# Patient Record
Sex: MALE | Race: ASIAN | NOT HISPANIC OR LATINO | Employment: FULL TIME | URBAN - METROPOLITAN AREA
[De-identification: names, ages, dates, MRNs, and addresses within clinical notes are randomized per-mention and may not be internally consistent; named-entity substitution may affect disease eponyms.]

---

## 2021-01-11 ENCOUNTER — COMMUNICATION - HEALTHEAST (OUTPATIENT)
Dept: NURSING | Facility: CLINIC | Age: 36
End: 2021-01-11

## 2021-01-12 ENCOUNTER — OFFICE VISIT - HEALTHEAST (OUTPATIENT)
Dept: CARDIOLOGY | Facility: CLINIC | Age: 36
End: 2021-01-12

## 2021-01-12 DIAGNOSIS — G47.33 OSA (OBSTRUCTIVE SLEEP APNEA): ICD-10-CM

## 2021-01-12 DIAGNOSIS — E11.9 DIABETES MELLITUS, TYPE 2 (H): ICD-10-CM

## 2021-01-12 DIAGNOSIS — R00.2 PALPITATIONS: ICD-10-CM

## 2021-01-12 DIAGNOSIS — I10 BENIGN ESSENTIAL HYPERTENSION: ICD-10-CM

## 2021-01-12 DIAGNOSIS — E66.01 MORBID OBESITY (H): ICD-10-CM

## 2021-01-12 DIAGNOSIS — I50.32 CHRONIC DIASTOLIC HEART FAILURE (H): ICD-10-CM

## 2021-01-12 LAB
BNP SERPL-MCNC: <10 PG/ML (ref 0–35)
HBA1C MFR BLD: 6.9 %
MAGNESIUM SERPL-MCNC: 2 MG/DL (ref 1.8–2.6)

## 2021-01-12 ASSESSMENT — MIFFLIN-ST. JEOR: SCORE: 2898.09

## 2021-01-13 ENCOUNTER — HOSPITAL ENCOUNTER (OUTPATIENT)
Dept: CARDIOLOGY | Facility: CLINIC | Age: 36
Discharge: HOME | End: 2021-01-13
Attending: INTERNAL MEDICINE

## 2021-01-22 ENCOUNTER — OFFICE VISIT - HEALTHEAST (OUTPATIENT)
Dept: INTERNAL MEDICINE | Facility: CLINIC | Age: 36
End: 2021-01-22

## 2021-01-22 DIAGNOSIS — I44.1 MOBITZ TYPE 1 SECOND DEGREE ATRIOVENTRICULAR BLOCK: ICD-10-CM

## 2021-01-22 DIAGNOSIS — E66.01 MORBID OBESITY (H): ICD-10-CM

## 2021-01-22 DIAGNOSIS — E11.9 TYPE 2 DIABETES MELLITUS WITHOUT COMPLICATION, WITHOUT LONG-TERM CURRENT USE OF INSULIN (H): ICD-10-CM

## 2021-01-22 DIAGNOSIS — Z23 NEED FOR IMMUNIZATION AGAINST INFLUENZA: ICD-10-CM

## 2021-01-22 DIAGNOSIS — I10 BENIGN ESSENTIAL HYPERTENSION: ICD-10-CM

## 2021-01-22 DIAGNOSIS — R00.2 PALPITATIONS: ICD-10-CM

## 2021-01-22 DIAGNOSIS — Z23 NEED FOR TDAP VACCINATION: ICD-10-CM

## 2021-01-22 DIAGNOSIS — G47.33 OSA (OBSTRUCTIVE SLEEP APNEA): ICD-10-CM

## 2021-01-22 LAB
ALBUMIN SERPL-MCNC: 4.5 G/DL (ref 3.5–5)
ALP SERPL-CCNC: 65 U/L (ref 45–120)
ALT SERPL W P-5'-P-CCNC: 131 U/L (ref 0–45)
ANION GAP SERPL CALCULATED.3IONS-SCNC: 11 MMOL/L (ref 5–18)
AST SERPL W P-5'-P-CCNC: 83 U/L (ref 0–40)
BILIRUB SERPL-MCNC: 0.8 MG/DL (ref 0–1)
BUN SERPL-MCNC: 11 MG/DL (ref 8–22)
CALCIUM SERPL-MCNC: 9.5 MG/DL (ref 8.5–10.5)
CHLORIDE BLD-SCNC: 106 MMOL/L (ref 98–107)
CHOLEST SERPL-MCNC: 172 MG/DL
CO2 SERPL-SCNC: 25 MMOL/L (ref 22–31)
CREAT SERPL-MCNC: 0.94 MG/DL (ref 0.7–1.3)
FASTING STATUS PATIENT QL REPORTED: YES
GFR SERPL CREATININE-BSD FRML MDRD: >60 ML/MIN/1.73M2
GLUCOSE BLD-MCNC: 105 MG/DL (ref 70–125)
HDLC SERPL-MCNC: 39 MG/DL
LDLC SERPL CALC-MCNC: 118 MG/DL
POTASSIUM BLD-SCNC: 4.2 MMOL/L (ref 3.5–5)
PROT SERPL-MCNC: 8 G/DL (ref 6–8)
SODIUM SERPL-SCNC: 142 MMOL/L (ref 136–145)
TRIGL SERPL-MCNC: 77 MG/DL

## 2021-01-22 RX ORDER — LISINOPRIL 20 MG/1
20 TABLET ORAL DAILY
Qty: 90 TABLET | Refills: 3 | Status: SHIPPED | OUTPATIENT
Start: 2021-01-22 | End: 2022-01-27

## 2021-01-22 ASSESSMENT — MIFFLIN-ST. JEOR: SCORE: 2800.23

## 2021-01-25 ENCOUNTER — COMMUNICATION - HEALTHEAST (OUTPATIENT)
Dept: INTERNAL MEDICINE | Facility: CLINIC | Age: 36
End: 2021-01-25

## 2021-01-28 ENCOUNTER — RECORDS - HEALTHEAST (OUTPATIENT)
Dept: ADMINISTRATIVE | Facility: OTHER | Age: 36
End: 2021-01-28

## 2021-01-28 LAB — RETINOPATHY: NEGATIVE

## 2021-02-02 ENCOUNTER — RECORDS - HEALTHEAST (OUTPATIENT)
Dept: HEALTH INFORMATION MANAGEMENT | Facility: CLINIC | Age: 36
End: 2021-02-02

## 2021-02-03 ENCOUNTER — RECORDS - HEALTHEAST (OUTPATIENT)
Dept: ADMINISTRATIVE | Facility: OTHER | Age: 36
End: 2021-02-03

## 2021-02-03 ENCOUNTER — HOSPITAL ENCOUNTER (OUTPATIENT)
Dept: CARDIOLOGY | Facility: CLINIC | Age: 36
Discharge: HOME | End: 2021-02-03
Attending: INTERNAL MEDICINE

## 2021-02-03 LAB
AORTIC ROOT: 2.5 CM
AORTIC VALVE MEAN VELOCITY: 101 CM/S
ASCENDING AORTA: 3.4 CM
AV DIMENSIONLESS INDEX VTI: 0.7
AV MEAN GRADIENT: 5 MMHG
AV PEAK GRADIENT: 9.5 MMHG
AV VALVE AREA: 3.6 CM2
AV VELOCITY RATIO: 0.7
BSA FOR ECHO PROCEDURE: 3.01 M2
CV BLOOD PRESSURE: ABNORMAL MMHG
CV ECHO HEIGHT: 68.8 IN
CV ECHO WEIGHT: 412 LBS
DOP CALC AO PEAK VEL: 154 CM/S
DOP CALC AO VTI: 26.1 CM
DOP CALC LVOT AREA: 4.91 CM2
DOP CALC LVOT DIAMETER: 2.5 CM
DOP CALC LVOT PEAK VEL: 102 CM/S
DOP CALC LVOT STROKE VOLUME: 93.2 CM3
DOP CALCLVOT PEAK VEL VTI: 19 CM
EJECTION FRACTION: 67 % (ref 55–75)
FRACTIONAL SHORTENING: 30 % (ref 28–44)
INTERVENTRICULAR SEPTUM IN END DIASTOLE: 1.5 CM (ref 0.6–1)
IVS/PW RATIO: 1.1
LA AREA 1: 17.1 CM2
LA AREA 2: 22.2 CM2
LEFT ATRIUM AREA: 17.1 CM2
LEFT ATRIUM LENGTH: 5.8 CM
LEFT ATRIUM SIZE: 3.8 CM
LEFT ATRIUM VOLUME INDEX: 18.5 ML/M2
LEFT ATRIUM VOLUME: 55.6 ML
LEFT VENTRICLE CARDIAC INDEX: 2.4 L/MIN/M2
LEFT VENTRICLE CARDIAC OUTPUT: 7.2 L/MIN
LEFT VENTRICLE DIASTOLIC VOLUME INDEX: 59.8 CM3/M2 (ref 34–74)
LEFT VENTRICLE DIASTOLIC VOLUME: 180 CM3 (ref 62–150)
LEFT VENTRICLE HEART RATE: 77 BPM
LEFT VENTRICLE MASS INDEX: 101.9 G/M2
LEFT VENTRICLE SYSTOLIC VOLUME INDEX: 19.9 CM3/M2 (ref 11–31)
LEFT VENTRICLE SYSTOLIC VOLUME: 60 CM3 (ref 21–61)
LEFT VENTRICULAR INTERNAL DIMENSION IN DIASTOLE: 5 CM (ref 4.2–5.8)
LEFT VENTRICULAR INTERNAL DIMENSION IN SYSTOLE: 3.5 CM (ref 2.5–4)
LEFT VENTRICULAR MASS: 306.8 G
LEFT VENTRICULAR OUTFLOW TRACT MEAN GRADIENT: 2 MMHG
LEFT VENTRICULAR OUTFLOW TRACT MEAN VELOCITY: 58.3 CM/S
LEFT VENTRICULAR OUTFLOW TRACT PEAK GRADIENT: 4 MMHG
LEFT VENTRICULAR POSTERIOR WALL IN END DIASTOLE: 1.4 CM (ref 0.6–1)
LV STROKE VOLUME INDEX: 31 ML/M2
MITRAL VALVE E/A RATIO: 1.3
MV AVERAGE E/E' RATIO: 7.9 CM/S
MV DECELERATION TIME: 232 MS
MV E'TISSUE VEL-LAT: 14.1 CM/S
MV E'TISSUE VEL-MED: 8.68 CM/S
MV LATERAL E/E' RATIO: 6.4
MV MEDIAL E/E' RATIO: 10.4
MV PEAK A VELOCITY: 71.6 CM/S
MV PEAK E VELOCITY: 90.3 CM/S
NUC REST DIASTOLIC VOLUME INDEX: 6592 LBS
NUC REST SYSTOLIC VOLUME INDEX: 68.75 IN
TRICUSPID VALVE ANULAR PLANE SYSTOLIC EXCURSION: 2.9 CM

## 2021-02-03 ASSESSMENT — MIFFLIN-ST. JEOR: SCORE: 2800.23

## 2021-02-04 ENCOUNTER — AMBULATORY - HEALTHEAST (OUTPATIENT)
Dept: CARDIOLOGY | Facility: CLINIC | Age: 36
End: 2021-02-04

## 2021-02-09 ENCOUNTER — OFFICE VISIT - HEALTHEAST (OUTPATIENT)
Dept: CARDIOLOGY | Facility: CLINIC | Age: 36
End: 2021-02-09

## 2021-02-09 DIAGNOSIS — I10 ESSENTIAL HYPERTENSION: ICD-10-CM

## 2021-02-09 ASSESSMENT — MIFFLIN-ST. JEOR: SCORE: 2777.55

## 2021-02-11 ENCOUNTER — OFFICE VISIT - HEALTHEAST (OUTPATIENT)
Dept: EDUCATION SERVICES | Facility: CLINIC | Age: 36
End: 2021-02-11

## 2021-02-11 DIAGNOSIS — E11.9 TYPE 2 DIABETES MELLITUS WITHOUT COMPLICATION, WITHOUT LONG-TERM CURRENT USE OF INSULIN (H): ICD-10-CM

## 2021-02-23 ENCOUNTER — OFFICE VISIT - HEALTHEAST (OUTPATIENT)
Dept: INTERNAL MEDICINE | Facility: CLINIC | Age: 36
End: 2021-02-23

## 2021-02-23 DIAGNOSIS — I10 BENIGN ESSENTIAL HYPERTENSION: ICD-10-CM

## 2021-02-23 DIAGNOSIS — I44.1 MOBITZ TYPE 1 SECOND DEGREE ATRIOVENTRICULAR BLOCK: ICD-10-CM

## 2021-02-23 DIAGNOSIS — E66.01 MORBID OBESITY (H): ICD-10-CM

## 2021-02-23 DIAGNOSIS — G47.33 OSA (OBSTRUCTIVE SLEEP APNEA): ICD-10-CM

## 2021-02-23 DIAGNOSIS — R79.89 ABNORMAL LFTS: ICD-10-CM

## 2021-02-23 DIAGNOSIS — E11.9 TYPE 2 DIABETES MELLITUS WITHOUT COMPLICATION, WITHOUT LONG-TERM CURRENT USE OF INSULIN (H): ICD-10-CM

## 2021-03-03 ENCOUNTER — RECORDS - HEALTHEAST (OUTPATIENT)
Dept: ADMINISTRATIVE | Facility: OTHER | Age: 36
End: 2021-03-03

## 2021-03-08 ENCOUNTER — COMMUNICATION - HEALTHEAST (OUTPATIENT)
Dept: INTERNAL MEDICINE | Facility: CLINIC | Age: 36
End: 2021-03-08

## 2021-03-11 ENCOUNTER — OFFICE VISIT - HEALTHEAST (OUTPATIENT)
Dept: EDUCATION SERVICES | Facility: CLINIC | Age: 36
End: 2021-03-11

## 2021-03-11 DIAGNOSIS — E11.9 TYPE 2 DIABETES MELLITUS WITHOUT COMPLICATION, WITHOUT LONG-TERM CURRENT USE OF INSULIN (H): ICD-10-CM

## 2021-03-30 ENCOUNTER — RECORDS - HEALTHEAST (OUTPATIENT)
Dept: ADMINISTRATIVE | Facility: OTHER | Age: 36
End: 2021-03-30

## 2021-04-14 ENCOUNTER — AMBULATORY - HEALTHEAST (OUTPATIENT)
Dept: NURSING | Facility: CLINIC | Age: 36
End: 2021-04-14

## 2021-04-15 ENCOUNTER — COMMUNICATION - HEALTHEAST (OUTPATIENT)
Dept: EDUCATION SERVICES | Facility: CLINIC | Age: 36
End: 2021-04-15

## 2021-05-05 ENCOUNTER — AMBULATORY - HEALTHEAST (OUTPATIENT)
Dept: NURSING | Facility: CLINIC | Age: 36
End: 2021-05-05

## 2021-06-05 VITALS
RESPIRATION RATE: 14 BRPM | SYSTOLIC BLOOD PRESSURE: 132 MMHG | DIASTOLIC BLOOD PRESSURE: 88 MMHG | WEIGHT: 315 LBS | HEIGHT: 69 IN | BODY MASS INDEX: 46.65 KG/M2 | HEART RATE: 64 BPM

## 2021-06-05 VITALS
HEART RATE: 64 BPM | SYSTOLIC BLOOD PRESSURE: 160 MMHG | BODY MASS INDEX: 46.65 KG/M2 | DIASTOLIC BLOOD PRESSURE: 70 MMHG | HEIGHT: 69 IN | RESPIRATION RATE: 16 BRPM | WEIGHT: 315 LBS

## 2021-06-05 VITALS
OXYGEN SATURATION: 97 % | DIASTOLIC BLOOD PRESSURE: 92 MMHG | BODY MASS INDEX: 46.65 KG/M2 | HEIGHT: 69 IN | SYSTOLIC BLOOD PRESSURE: 156 MMHG | HEART RATE: 76 BPM | TEMPERATURE: 98.5 F | WEIGHT: 315 LBS

## 2021-06-05 VITALS — BODY MASS INDEX: 46.65 KG/M2 | HEIGHT: 69 IN | WEIGHT: 315 LBS

## 2021-06-05 VITALS
HEART RATE: 84 BPM | TEMPERATURE: 98.9 F | SYSTOLIC BLOOD PRESSURE: 146 MMHG | OXYGEN SATURATION: 96 % | BODY MASS INDEX: 59.16 KG/M2 | WEIGHT: 315 LBS | DIASTOLIC BLOOD PRESSURE: 90 MMHG

## 2021-06-14 NOTE — PATIENT INSTRUCTIONS - HE
New patient visit to internal medicine, establishing primary care for this 35-year-old , who has a new diagnosis of type 2 diabetes, and is now working with cardiology after having been referred from the emergency department on January 8 after presenting with Palpitations and tachycardia, noted to be hypertensive.  Issues are as follows: Type 2 diabetes in the context of severe obesity with body mass index of 61, no signs of endorgan damage, A1c 6.9 measured January 12, 2021, will hold off on medication for now and pursue aggressive lifestyle improvement, and I am referring him to Diabetes Education.  Morbid obesity with body mass index of 61.29, at just about his all-time peak weight of 412 pounds, poor dietary habits, needs aggressive lifestyle management, diabetes education program will help with that.  Palpitations, event monitor study January 21, 2021 showing 3 occasions of Mobitz type I second-degree AV block, and also sinus arrhythmia with bradycardia down to 25 bpm, in the context of likely severe obstructive sleep apnea.  Obstructive sleep apnea, likely severe, he will be having sleep clinic consultation February 3, 2021, and almost surely will get started on CPAP.  Hypertension in the context of obesity, type 2 diabetes, and obstructive sleep apnea, I am going to add lisinopril 20 mg a day on top of his carvedilol.  Lower extremity edema, related to obesity, has normal kidney function markers, will check his liver chemistry tests when I see him back in about a month for blood pressure recheck.  Needs to get COVID-19 vaccine even though he is a young 35 years old, because of comorbidities.    Referral sent today for diabetes education program, also ophthalmology for diabetic eye exam.  Prescription transmitted for lisinopril 20 mg daily.  Vaccines for tetanus and influenza.  See back in 1 month.    He is fasting this morning, so we will check a comprehensive metabolic panel so that we can see his  liver chemistries, and also lipid profile.      Going issue by issue:    Type 2 diabetes in the context of severe obesity with body mass index of 61, no signs of endorgan damage, new diagnosis A1c 6.9 measured January 12, 2021, will hold off on medication for now and pursue aggressive lifestyle improvement, and I am referring him to Diabetes Education.  He does not have any symptoms of neuropathy, at this point there does not appear to be any endorgan damage.  His A1c is less than 7.0.  I think we could hold off on medication now and see how his glycemic control response to lifestyle improvement.  He is already made some significant changes, cutting out rice consumption completely as of about 1 week ago, also cut out sugared pop.  I will prescribe for him lancets, glucose test strips, and a glucometer, so we can start checking his blood sugars twice a day.  He will get additional instructions from the diabetes educator.  I agree with his cutting way back on the carbohydrate intake, but I would caution him not to increase dietary fats or protein.  Instead, I want him to eat plenty of vegetables, which are filling but would not bring as many calories.  Solids are great with light dressing.  Start each meal with a glass of water.  I asked him to check blood sugars first thing in the morning before breakfast, and then collect a reading later in the day, varying the time of day so that we can get some idea of what his diurnal pattern looks like.  I asked him to record his blood sugar readings in a notebook.  Ophthalmology consultation requested.    Morbid obesity with body mass index of 61.29, at just about his all-time peak weight of 412 pounds, poor dietary habits, needs aggressive lifestyle management, diabetes education program will help with that.  I would like to see him implement a 1500-calorie a day diet.  He will get more details through diabetes education.  He told me that he consumes 0 alcohol.  I reminded him  to please completely cut out sweets, Skip dessert, eat slower, control portion size.  Eat a small supper.    Palpitations, event monitor study January 21, 2021 showing 3 occasions of Mobitz type I second-degree AV block, and also sinus arrhythmia with bradycardia down to 25 bpm, in the context of likely severe obstructive sleep apnea.  Dr. Lawrence started him on carvedilol 6.25 mg twice a day, and he will be following up with her in about 1 month February 9, 2021    Obstructive sleep apnea, likely severe, he will be having sleep clinic consultation February 3, 2021, and almost surely will get started on CPAP.  He reports fatigue, but not severe daytime sleepiness.  He shares his house with siblings, who told him that he snores like a machine and has apneic spells.    Hypertension in the context of obesity, type 2 diabetes, and obstructive sleep apnea, I am going to add lisinopril 20 mg a day on top of his carvedilol.  In clinic blood pressure today 156/92, eventually 1 to get to 120/80 at rest in the seated position.  He already purchased home blood pressure machine with a big cuff.  I asked him to bring the machine with him to future clinic appointments either here in internal medicine or at cardiology, so that the machine can be validated against a manual readings we can trust the numbers.    Lower extremity edema, related to obesity, has normal kidney function markers, will check his liver chemistry tests when I see him back in about a month for blood pressure recheck.      Needs to get COVID-19 vaccine even though he is a young 35 years old, because of comorbidities.

## 2021-06-14 NOTE — PROGRESS NOTES
Clinic Care Coordination Contact  [facility]/Voicemail    Clinical Data: Care Coordinator Outreach  Outreach attempted x 1.  Left message on patient's voicemail with call back information and requested return call.  Plan: Care Coordinator will try to reach patient again in 1-2 business days.      **Patient has appointment scheduled with Dr. Boyd at [facility] clinic on 1/22/21 @ 11:20.   n/a

## 2021-06-14 NOTE — PROGRESS NOTES
Clinic Care Coordination Contact  [facility]/Voicemail    Clinical Data: Care Coordinator Outreach  Outreach attempted x 2.  Left message on patient's voicemail with call back information and requested return call.  Plan: Care Coordinator will send unable to contact letter with care coordinator contact information via [employer]. Care Coordinator will do no further outreaches at this time.

## 2021-06-14 NOTE — PROGRESS NOTES
Office Visit - New Patient   Jayesh Courtney   35 y.o.  male    Date of visit: 1/22/2021  Physician: Liam Boyd MD     Assessment and Plan    New patient visit to internal medicine, establishing primary care for this 35-year-old , who has a new diagnosis of type 2 diabetes, and is now working with cardiology after having been referred from the emergency department on January 8 after presenting with Palpitations and tachycardia, noted to be hypertensive.  Issues are as follows: Type 2 diabetes in the context of severe   obesity with body mass index of 61, no signs of endorgan damage, A1c 6.9 measured January 12, 2021, will hold off on medication for now and pursue aggressive lifestyle improvement, and I am referring him to Diabetes Education.  Morbid obesity with body mass index of 61.29, at just about his all-time peak weight of 412 pounds, poor dietary habits, needs aggressive lifestyle management, diabetes education program will help with that.  Palpitations, event monitor study January 21, 2021 showing 3 occasions of Mobitz type I second-degree AV block, and also sinus arrhythmia with bradycardia down to 25 bpm, in the context of likely severe obstructive sleep apnea.  Obstructive sleep apnea, likely severe, he will be having sleep clinic consultation February 3, 2021, and almost surely will get started on CPAP.  Hypertension in the context of obesity, type 2 diabetes, and obstructive sleep apnea, I am going to add lisinopril 20 mg a day on top of his carvedilol.  Lower extremity edema, related to obesity, has normal kidney function markers, will check his liver chemistry tests when I see him back in about a month for blood pressure recheck.  Needs to get COVID-19 vaccine even though he is a young 35 years old, because of comorbidities.    Referral sent today for diabetes education program, also ophthalmology for diabetic eye exam.  Prescription transmitted for lisinopril 20 mg daily.  Vaccines for  tetanus and influenza.  See back in 1 month.    He is fasting this morning, so we will check a comprehensive metabolic panel so that we can see his liver chemistries, and also lipid profile.    Going issue by issue:    Type 2 diabetes in the context of severe obesity with body mass index of 61, no signs of endorgan damage, new diagnosis A1c 6.9 measured January 12, 2021, will hold off on medication for now and pursue aggressive lifestyle improvement, and I am referring him to Diabetes Education.  He does not have any symptoms of neuropathy, at this point there does not appear to be any endorgan damage.  His A1c is less than 7.0.  I think we could hold off on medication now and see how his glycemic control response to lifestyle improvement.  He is already made some significant changes, cutting out rice consumption completely as of about 1 week ago, also cut out sugared pop.  I will prescribe for him lancets, glucose test strips, and a glucometer, so we can start checking his blood sugars twice a day.  He will get additional instructions from the diabetes educator.  I agree with his cutting way back on the carbohydrate intake, but I would caution him not to increase dietary fats or protein.  Instead, I want him to eat plenty of vegetables, which are filling but would not bring as many calories.  Solids are great with light dressing.  Start each meal with a glass of water.  I asked him to check blood sugars first thing in the morning before breakfast, and then collect a reading later in the day, varying the time of day so that we can get some idea of what his diurnal pattern looks like.  I asked him to record his blood sugar readings in a notebook.  Ophthalmology consultation requested.    Morbid obesity with body mass index of 61.29, at just about his all-time peak weight of 412 pounds, poor dietary habits, needs aggressive lifestyle management, diabetes education program will help with that.  I would like to see him  implement a 1500-calorie a day diet.  He will get more details through diabetes education.  He told me that he consumes 0 alcohol.  I reminded him to please completely cut out sweets, Skip dessert, eat slower, control portion size.  Eat a small supper.    Palpitations, event monitor study January 21, 2021 showing 3 occasions of Mobitz type I second-degree AV block, and also sinus arrhythmia with bradycardia down to 25 bpm, in the context of likely severe obstructive sleep apnea.  Dr. Lawrence started him on carvedilol 6.25 mg twice a day, and he will be following up with her in about 1 month February 9, 2021    He will be undergoing echocardiogram and returning to see Dr. Lawrence in about a month    Obstructive sleep apnea, likely severe, he will be having sleep clinic consultation February 3, 2021, and almost surely will get started on CPAP.  He reports fatigue, but not severe daytime sleepiness.  He shares his house with siblings, who told him that he snores like a machine and has apneic spells.    Hypertension in the context of obesity, type 2 diabetes, and obstructive sleep apnea, I am going to add lisinopril 20 mg a day on top of his carvedilol.  In clinic blood pressure today 156/92, eventually 1 to get to 120/80 at rest in the seated position.  He already purchased home blood pressure machine with a big cuff.  I asked him to bring the machine with him to future clinic appointments either here in internal medicine or at cardiology, so that the machine can be validated against a manual readings we can trust the numbers.    Lower extremity edema, related to obesity, has normal kidney function markers, will check his liver chemistry tests when I see him back in about a month for blood pressure recheck.      Needs to get COVID-19 vaccine even though he is a young 35 years old, because of  comorbidities.      ---------------------------------------------------------------------------------------------------------------------------  Chief Complaint   Chief Complaint   Patient presents with     Establish Care     Fasting - Diabetes, weight     Annual Exam        ---------------------------------------------------------------------------------------------------------------------------  History of Present Illness  This 35 y.o. old    New patient visit to internal medicine, establishing primary care for this 35-year-old , who has a new diagnosis of type 2 diabetes, and is now working with cardiology after having been referred from the emergency department on January 8 after presenting with Palpitations and tachycardia, noted to be hypertensive.  Issues are as follows: Type 2 diabetes in the context of severe obesity with body mass index of 61, no signs of endorgan damage, A1c 6.9 measured January 12, 2021, will hold off on medication for now and pursue aggressive lifestyle improvement, and I am referring him to Diabetes Education.  Morbid obesity with body mass index of 61.29, at just about his all-time peak weight of 412 pounds, poor dietary habits, needs aggressive lifestyle management, diabetes education program will help with that.  Palpitations, event monitor study January 21, 2021 showing 3 occasions of Mobitz type I second-degree AV block, and also sinus arrhythmia with bradycardia down to 25 bpm, in the context of likely severe obstructive sleep apnea.  Obstructive sleep apnea, likely severe, he will be having sleep clinic consultation February 3, 2021, and almost surely will get started on CPAP.  Hypertension in the context of obesity, type 2 diabetes, and obstructive sleep apnea, I am going to add lisinopril 20 mg a day on top of his carvedilol.  Lower extremity edema, related to obesity, has normal kidney function markers, will check his liver chemistry tests when I see him back in  about a month for blood pressure recheck.  Needs to get COVID-19 vaccine even though he is a young 35 years old, because of comorbidities.        PATIENT-ACTIVATED ECG MONITOR REPORT     INTERPRETATION DATE: 1/21/2021     TEST DATE:   1/13/2021 to 1/19/2021     INTERPRETATION:  Jayesh Courtney had a patient-activated ECG monitor to evaluate palpitations.  Baseline transmission on 1/13/2021 showed sinus rhythm rate 88 bpm.  No symptoms were reported.   Auto transmissions showed Mobitz 1 second-degree AV block on 3 occasions with some sinus arrhythmia and heart rates as low as approximately 25 bpm for several seconds.  These occurred on 114 1/15 and 1/16/2021 at times 8:18 AM 6:32 AM and 10:37 AM.  The remainder of vital transmissions all showed normal sinus rhythm without ectopy or AV block.  Auto analysis showed heart rates between 28 and 120 beats per minute.  PAC burden was estimated at less than 1%,   PVC burden was not able to be calculated  No atrial fibrillation was noted.  There were no pauses greater than 3 seconds.     CONCLUSION:    No symptoms were noted during the monitoring period.  3 episodes of transient Mobitz type I second-degree AV block were noted.  2 of the 3 occurred at night.  No atrial fibrillation was present.       Cardiology Dr Lawrence  1.  Hypertension: Newly diagnosed and poorly controlled  2.  Morbid obesity  3.  Likely untreated sleep apnea  4.  Palpitations  5.  Lower extremity edema     Plan:  1.  Magnesium, hemoglobin A1c due to elevated glucose over 200 on ER visit, BNP  2.  Echocardiogram to evaluate for structural heart disease  3.  1 patch 7-day monitor  4.  Referral to sleep study clinic  5.  Encouraged weight loss, diet and exercise  6.  Start carvedilol 6.25 mg twice daily.          Wt Readings from Last 3 Encounters:   01/22/21 (!) 412 lb (186.9 kg)   01/12/21 (!) 432 lb 11.2 oz (196.3 kg)   01/08/21 (!) 428 lb (194.1 kg)     BP Readings from Last 3 Encounters:   01/22/21 (!) 156/92  "  01/12/21 160/70   01/08/21 141/76       Lab Results   Component Value Date    WBC 9.1 01/08/2021    HGB 14.5 01/08/2021    HCT 45.7 01/08/2021     01/08/2021     01/08/2021    K 4.1 01/08/2021     01/08/2021    CREATININE 0.87 01/08/2021    BUN 9 01/08/2021    CO2 24 01/08/2021    TSH 3.94 01/08/2021    HGBA1C 6.9 (H) 01/12/2021         Immunization History   Administered Date(s) Administered     Influenza [id number], 01/19/2010     MMR 05/23/1990     Review of Systems: A comprehensive review of systems was negative except as noted.  ---------------------------------------------------------------------------------------------------------------------------    Medications and Allergies   Current Outpatient Medications   Medication Sig Dispense Refill     carvediloL (COREG) 6.25 MG tablet Take 1 tablet (6.25 mg total) by mouth 2 (two) times a day with meals. 60 tablet 3     No current facility-administered medications for this visit.      No Known Allergies     Active Ambulatory Problems     Diagnosis Date Noted     Morbid obesity (H) 01/12/2021     Resolved Ambulatory Problems     Diagnosis Date Noted     No Resolved Ambulatory Problems     No Additional Past Medical History     No past surgical history on file.   No past medical history on file.     Family and Social History   Family History   Problem Relation Age of Onset     Diabetes Mother      Heart attack Father         Stents placed        Social History     Tobacco Use     Smoking status: Never Smoker     Smokeless tobacco: Never Used   Substance Use Topics     Alcohol use: No     Drug use: No        Physical Exam   General Appearance: Very pleasant, normal mental status, severely overweight, breathing comfortably, mobility is still good.    BP (!) 156/92 (Patient Site: Right Arm, Patient Position: Sitting, Cuff Size: Adult Large)   Pulse 76   Temp 98.5  F (36.9  C) (Oral)   Ht 5' 8.75\" (1.746 m)   Wt (!) 412 lb (186.9 kg)   SpO2 97%   " BMI 61.29 kg/m      General: Alert, in no distress  Skin: No significant lesion seen.  Eyes/nose/throat: Eyes without scleral icterus, eye movements normal, pupils equal and reactive, oropharynx clear  MSK: Neck with good ROM  Lymphatic: Neck without adenopathy or masses  Endocrine: Thyroid with no nodules to palpation  Pulm: Lungs clear to auscultation bilaterally  Cardiac: Distant heart sounds, heart with regular rate and rhythm, no murmur or gallop  GI: Abdomen very obese, soft, nontender.  Unable to appreciate any organomegaly  MSK: Extremities no tenderness   1+ bilateral leg edema  Neuro: Moves all extremities, without focal weakness  Psych: Alert, normal mental status. Normal affect and speech         Additional Information        Liam Boyd MD  Internal Medicine

## 2021-06-15 NOTE — PROGRESS NOTES
Type of Service: Telephone Visit  Originally scheduled for a video visit. Patient unable to connect to video. Converted to telephone visit.    Patient would like to receive their AVS by AVS Preference: [employer].    DIABETES CARE PLAN    Assessment/Plan:     Initial visit for newly diagnosed Type 2 diabetes. Instructed on what is diabetes and how to control it. Reviewed symptoms and treatment of high and low blood sugar. Instructed on general diet guidelines, carb counting and portion control. Stressed the importance of weight control and daily activity.    Jayesh is checking his blood sugar twice per day. The 14 day blood sugar average is 94. Since learning he had diabetes Jayesh is eating a very low carbohydrate diet of mostly protein and vegetables. Noted he lost 20 pounds in one month. Patient states he is not hungry and plans to continue this eating style. His next goal is to increase activity.     Diabetes Medications:none    PLAN  1. Test blood sugar twice per day at different times. Key times are before breakfast, 2 hours after the largest meal or bedtime.  2. Eat smaller amounts more often. Avoid skipping meals.  3. Avoid sugary drinks (regular soda, lemonade, sweetened tea and Gatorade).  4. Eat fresh fruit instead of juice.  5. Include high fiber, whole grain foods instead of processed white foods. Healthier choices are whole grain cereals, whole wheat pasta, brown or wild rice and whole wheat bread.   6. Aim for foods with 3 grams of fiber or more per serving. Limit added sugar to 36 grams of added sugar per day.  7. Stay active every day; try not to sit for more than 30 minutes and walk 20-30 minutes most days of the week.   8. Next video visit on 3/11/21 at 1:00 PM.    Blood sugar targets:   Before meals:   1-2 hours after meals: less 180  Bedtime:     A1c target of less than 7.0% (estimated average blood sugar of 154 on your meter)    Subjective/Objective:   Jayesh Courtney is a 35 y.o. male  referred by Liam Boyd MD. Diagnosed with Type 2 diabetes 1/12/21. Working from home. Lives with 7 people and 2 babies. He helps with the cooking.    Eating habits/recall: Stopped drinking sugar soda, fast food and sweets.   Protein and vegetables. Not eating rice or noodles, sugary drinks.  Occasionally orders at [employer] and gets a cauliflower rice bowl.    Activity: Sedentary most of the day. Running errands. Has free weights down in the basement    Using Accu-chek Guide meter  Date Breakfast  Lunch  Dinner  Bedtime    Before After Before After Before After    2/11 103   88      2/10 107   80   102   2/9 105         2/8 110   93      2/7    99      2/6     87     2/5 95   90  89    2/4 88   89        Wt Readings from Last 3 Encounters:   02/09/21 (!) 407 lb (184.6 kg)   02/03/21 (!) 412 lb (186.9 kg)   01/22/21 (!) 412 lb (186.9 kg)      Lab Results   Component Value Date    HGBA1C 6.9 (H) 01/12/2021       EDUCATION    Monitoring   Meter: Discussed and Competent  Monitoring: Discussed and Competent  BG goals: Discussed and Literature provided    Nutrition Management  Nutrition Management: Discussed and Literature provided  Carb ID/Counting: Discussed and Literature provided  Healthy Snack Ideas: Discussed and Literature provided  Weight: Discussed and Literature provided  Portions/Balance: Discussed     Physical Activity: Discussed and Literature provided    Medications: Not addressed    Diabetes Disease Process: Discussed and Literature provided  A1c test and how it relates to meter numbers: Discussed and Literature provided    Acute Complications: Prevent, Detect, Treat:  Hypoglycemia: Discussed and Literature provided  Hyperglycemia: Discussed and Literature provided    Goal Setting and Problem Solving: Discussed and Literature provided  Barriers: Assessed  Psychosocial Adjustments: Assessed    Time: 60 minutes   Visit Type: Diabetes Self-Management Training ()  Diagnosis per referral: Type 2  diabetes without complication (E11.9)    Caroline Alicea, DORIS, LD, [facility]  Certified Diabetes Care and   2/11/2021

## 2021-06-15 NOTE — PATIENT INSTRUCTIONS - HE
1. Check blood sugar twice per day (before breakfast and bedtime).  2. Try a small bedtime snack for 3-5 days to see if the fasting blood sugar decreases.   3. Continue to eat a low carbohydrate diet.   4. Increase activity; try walking with varying amounts of speeds. Recommend lifting light weights 3 times per week.  5. Continue to drink a lot of water to stay hydrated, feel full and protect your kidneys.  6. Next telephone visit in one month on 4/15/21 at 1:00 PM.    Blood sugar goals:  Before meals   1-2 hours after meals: less 180  Bedtime:     A1c: less than 7.0% (estimated average blood sugar of 154 mg/dl)    Ways to break through a weight loss plateau    Continue to eat a low carbohydrate diet and eat vegetables at every meal    Increase exercise frequency or intensity    Strength training can help offset the drop in metabolic rate that occurs during weight loss    Increase protein intake and spread throughout the day    Manage stress. The stress hormone cortisol makes it more difficult to lose weight    Avoid alcohol    Eat more fiber    Drink a lot of water    Get plenty of sleep    Eat more one meal per week.

## 2021-06-15 NOTE — PROGRESS NOTES
Office Visit - Follow Up   Jayesh Courtney   35 y.o. male    Date of Visit: 2/23/2021    Chief Complaint   Patient presents with     Follow-up        -------------------------------------------------------------------------------------------------------------------------  Assessment and Plan    Follow-up since our establish care visit of January 22, 2021, he has already managed to lose 15 pounds, and has made substantial changes to his habits     He cut out rice, sweets, overall size of meals, and focusing on eating more vegetables.    I am optimistic that he can get control of diabetes using those lifestyle measures.  His A1c was 6.9 measured January 12, not too bad.    He has already had initial meeting with sleep specialist, and will be following up with them, and likely will be on BiPAP.    He is tolerating lisinopril 20 mg a day.  Today's in clinic blood pressure of 146/90 is not too bad, although not at goal of 120/80.  He did report a little bit of dry throat when he first started on lisinopril.  Hesitant to escalate the dose up to 40 mg a day.    Lets keep him 20 mg for now, but he will keep working the weight down, cut down on salt, and hopefully get started on treatment for sleep apnea, and hopefully that will get a blood pressure where it needs to be.    He continues to work as an  full-time, although he is based at home nowadays.    Issue by issue:    Type 2 diabetes in the context of severe obesity with body mass index of 59 down from 61, no signs of endorgan damage, new diagnosis A1c 6.9 measured January 12, 2021, will hold off on medication for now and pursue aggressive lifestyle improvement    He saw Diabetes Education.  He does not have any symptoms of neuropathy, at this point there does not appear to be any endorgan damage.  His A1c is less than 7.0.      He check his blood sugars 4 times a day, and I told him that he may be able to reduce that to twice a day, once he understands what his  pattern is, and as he makes progress with better glycemic control.    Ophthalmology consultation done--no diabetic retinopathy.    Wt Readings from Last 3 Encounters:   02/23/21 (!) 397 lb 11.2 oz (180.4 kg)   02/09/21 (!) 407 lb (184.6 kg)   02/03/21 (!) 412 lb (186.9 kg)       Palpitations, event monitor study January 21, 2021 showing 3 occasions of Mobitz type I second-degree AV block, and also sinus arrhythmia with bradycardia down to 25 bpm, in the context of likely severe obstructive sleep apnea; carvedilol stopped for that reason; mild left ventricular wall thickening seen on echocardiogram February 3, 2021     Carvedilol is been stopped.  His echocardiogram showed good systolic function, although there was mild concentric increase in left ventricular wall thickness observed.      At May 20 to continue to focus on blood pressure, and treating sleep apnea.    With untreated sleep apnea, his blood pressures may spike during the night, and that puts more load on his heart, possibly producing thickening of the muscle.     Obstructive sleep apnea, likely severe, he surely will get started on CPAP or BiPAP.  He reports fatigue, but not severe daytime sleepiness.  He shares his house with siblings, who told him that he snores like a machine and has apneic spells.     Hypertension in the context of obesity, type 2 diabetes, and obstructive sleep apnea, on lisinopril 20 mg a day,  Carvedilol stoppped.  In clinic blood pressure today 156/92, eventually 1 to get to 120/80 at rest in the seated position.  He already purchased home blood pressure machine with a big cuff.     Lower extremity edema, related to obesity, has normal kidney function markers, will check his liver chemistry tests when I see him back in about a month for blood pressure recheck.    Elevated liver enzymes ALT and AST, which is almost surely fatty liver related to obesity and diabetes.  Lets plan to recheck this in 6 months.  Lab Results   Component  Value Date    CREATININE 0.94 01/22/2021    BUN 11 01/22/2021     01/22/2021    K 4.2 01/22/2021     01/22/2021    CO2 25 01/22/2021     Lab Results   Component Value Date     (H) 01/22/2021    AST 83 (H) 01/22/2021    ALKPHOS 65 01/22/2021    BILITOT 0.8 01/22/2021     Low HDL (good cholesterol)  I expect this to also improve in response to his lifestyle measures.  Lets also check this in 6 months, same time as recheck of liver chemistries.  Lab Results   Component Value Date    CHOL 172 01/22/2021     Lab Results   Component Value Date    HDL 39 (L) 01/22/2021     Lab Results   Component Value Date    LDLCALC 118 01/22/2021     Lab Results   Component Value Date    TRIG 77 01/22/2021     No components found for: CHOLHDL    Needs to get COVID-19 vaccine even though he is a young 35 years old, because of comorbidities.       --------------------------------------------------------------------------------------------------------------------------  History of Present Illness  This 35 y.o. old     Follow-up since our establish care visit of January 22, 2021, he has already managed to lose 15 pounds, and has made substantial changes to his habits     He cut out rice, sweets, overall size of meals, and focusing on eating more vegetables.    I am optimistic that he can get control of diabetes using those lifestyle measures.  His A1c was 6.9 measured January 12, not too bad.    He has already had initial meeting with sleep specialist, and will be following up with them, and likely will be on BiPAP.    He is tolerating lisinopril 20 mg a day.  Today's in clinic blood pressure of 146/90 is not too bad, although not at goal of 120/80.  He did report a little bit of dry throat when he first started on lisinopril.  Hesitant to escalate the dose up to 40 mg a day.    Lets keep him 20 mg for now, but he will keep working the weight down, cut down on salt, and hopefully get started on treatment for sleep apnea,  and hopefully that will get a blood pressure where it needs to be.    He continues to work as an  full-time, although he is based at home nowadays.      Wt Readings from Last 3 Encounters:   02/23/21 (!) 397 lb 11.2 oz (180.4 kg)   02/09/21 (!) 407 lb (184.6 kg)   02/03/21 (!) 412 lb (186.9 kg)     BP Readings from Last 3 Encounters:   02/23/21 150/86   02/09/21 132/88   02/03/21 138/70       Lab Results   Component Value Date    WBC 9.1 01/08/2021    HGB 14.5 01/08/2021    HCT 45.7 01/08/2021     01/08/2021    CHOL 172 01/22/2021    TRIG 77 01/22/2021    HDL 39 (L) 01/22/2021     (H) 01/22/2021    AST 83 (H) 01/22/2021     01/22/2021    K 4.2 01/22/2021     01/22/2021    CREATININE 0.94 01/22/2021    BUN 11 01/22/2021    CO2 25 01/22/2021    TSH 3.94 01/08/2021    HGBA1C 6.9 (H) 01/12/2021      ---------------------------------------------------------------------------------------------------------------------------    Medications, Allergies, Social, and Problem List   Current Outpatient Medications   Medication Sig Dispense Refill     blood glucose meter (GLUCOMETER) Use 1 each As Directed as needed. Dispense glucometer brand per patient's insurance at pharmacy discretion. 1 each 0     blood glucose test (GLUCOSE BLOOD) strips Use 1 each As Directed 2 (two) times a day at 7:30am and 4:30pm. Dispense brand per patient's insurance at pharmacy discretion. 100 strip 11     generic lancets Use 1 each As Directed 2 (two) times a day at 7:30am and 4:30pm. Dispense brand per patient's insurance at pharmacy discretion. 120 each 11     lisinopriL (PRINIVIL,ZESTRIL) 20 MG tablet Take 1 tablet (20 mg total) by mouth daily. 90 tablet 3     No current facility-administered medications for this visit.      No Known Allergies  Social History     Tobacco Use     Smoking status: Never Smoker     Smokeless tobacco: Never Used   Substance Use Topics     Alcohol use: No     Drug use: No     Patient  Active Problem List   Diagnosis     Morbid obesity (H)     Type 2 diabetes mellitus without complication, without long-term current use of insulin (H)     COOPER (obstructive sleep apnea)     Benign essential hypertension     Mobitz type 1 second degree atrioventricular block     Palpitations     Abnormal LFTs        Reviewed, reconciled and updated       Physical Exam   General Appearance: He appears well, breathing comfortably, blood pressure came down from 150/86 to repeat reading of 146/90.    /86 (Patient Site: Right Arm, Patient Position: Sitting, Cuff Size: Adult Large)   Pulse 84   Temp 98.9  F (37.2  C) (Oral)   Wt (!) 397 lb 11.2 oz (180.4 kg)   SpO2 96%   BMI 59.16 kg/m           Additional Information   I spent 20 minutes on this encounter, including reviewing interval history since our last visit, examining the patient, explaining and counseling the issues enumerated in the Assessment and Plan (patient given a copy)       Liam Boyd MD

## 2021-06-15 NOTE — PATIENT INSTRUCTIONS - HE
Follow-up since our establish care visit of January 22, 2021, he has already managed to lose 15 pounds, and has made substantial changes to his habits     He cut out rice, sweets, overall size of meals, and focusing on eating more vegetables.    I am optimistic that he can get control of diabetes using those lifestyle measures.  His A1c was 6.9 measured January 12, not too bad.    He has already had initial meeting with sleep specialist, and will be following up with them, and likely will be on BiPAP.    He is tolerating lisinopril 20 mg a day.  Today's in clinic blood pressure of 146/90 is not too bad, although not at goal of 120/80.  He did report a little bit of dry throat when he first started on lisinopril.  Hesitant to escalate the dose up to 40 mg a day.    Lets keep him 20 mg for now, but he will keep working the weight down, cut down on salt, and hopefully get started on treatment for sleep apnea, and hopefully that will get a blood pressure where it needs to be.    He continues to work as an  full-time, although he is based at home nowadays.    Issue by issue:    Type 2 diabetes in the context of severe obesity with body mass index of 59 down from 61, no signs of endorgan damage, new diagnosis A1c 6.9 measured January 12, 2021, will hold off on medication for now and pursue aggressive lifestyle improvement    He saw Diabetes Education.  He does not have any symptoms of neuropathy, at this point there does not appear to be any endorgan damage.  His A1c is less than 7.0.      He check his blood sugars 4 times a day, and I told him that he may be able to reduce that to twice a day, once he understands what his pattern is, and as he makes progress with better glycemic control.    Ophthalmology consultation done--no diabetic retinopathy.    Wt Readings from Last 3 Encounters:   02/23/21 (!) 397 lb 11.2 oz (180.4 kg)   02/09/21 (!) 407 lb (184.6 kg)   02/03/21 (!) 412 lb (186.9 kg)       Palpitations,  event monitor study January 21, 2021 showing 3 occasions of Mobitz type I second-degree AV block, and also sinus arrhythmia with bradycardia down to 25 bpm, in the context of likely severe obstructive sleep apnea; carvedilol stopped for that reason; mild left ventricular wall thickening seen on echocardiogram February 3, 2021     Carvedilol is been stopped.  His echocardiogram showed good systolic function, although there was mild concentric increase in left ventricular wall thickness observed.      At May 20 to continue to focus on blood pressure, and treating sleep apnea.    With untreated sleep apnea, his blood pressures may spike during the night, and that puts more load on his heart, possibly producing thickening of the muscle.     Obstructive sleep apnea, likely severe, he surely will get started on CPAP or BiPAP.  He reports fatigue, but not severe daytime sleepiness.  He shares his house with siblings, who told him that he snores like a machine and has apneic spells.     Hypertension in the context of obesity, type 2 diabetes, and obstructive sleep apnea, on lisinopril 20 mg a day,  Carvedilol stoppped.  In clinic blood pressure today 156/92, eventually 1 to get to 120/80 at rest in the seated position.  He already purchased home blood pressure machine with a big cuff.     Lower extremity edema, related to obesity, has normal kidney function markers, will check his liver chemistry tests when I see him back in about a month for blood pressure recheck.    Elevated liver enzymes ALT and AST, which is almost surely fatty liver related to obesity and diabetes.  Lets plan to recheck this in 6 months.  Lab Results   Component Value Date    CREATININE 0.94 01/22/2021    BUN 11 01/22/2021     01/22/2021    K 4.2 01/22/2021     01/22/2021    CO2 25 01/22/2021     Lab Results   Component Value Date     (H) 01/22/2021    AST 83 (H) 01/22/2021    ALKPHOS 65 01/22/2021    BILITOT 0.8 01/22/2021     Low  HDL (good cholesterol)  I expect this to also improve in response to his lifestyle measures.  Lets also check this in 6 months, same time as recheck of liver chemistries.  Lab Results   Component Value Date    CHOL 172 01/22/2021     Lab Results   Component Value Date    HDL 39 (L) 01/22/2021     Lab Results   Component Value Date    LDLCALC 118 01/22/2021     Lab Results   Component Value Date    TRIG 77 01/22/2021     No components found for: CHOLHDL    Needs to get COVID-19 vaccine even though he is a young 35 years old, because of comorbidities.

## 2021-06-15 NOTE — PROGRESS NOTES
Type of Service: Telephone Visit    Patient would like to receive their AVS by AVS Preference: [facility].     DIABETES CARE PLAN    Assessment/Plan:     One month follow-up video visit for diabetes education and counseling. Jayesh is eating a low carbohydrate diet. Patient has lost 18 pounds since the beginning of February. He is at a weight plateau. Discussed ways to get off the weight plateau. Jayesh is checking his blood sugar twice per day. The 30 day blood sugar average: 101 (59 tests). He is aiming for all fasting blood sugars less than 100. Discussed blood sugar goals.    Current Diabetes Medications: None    PLAN:   1. Check blood sugar twice per day (before breakfast and bedtime).  2. Try a small bedtime snack for 3-5 days to see if the fasting blood sugar decreases.   3. Continue to eat a low carbohydrate diet.   4. Increase activity; try walking with varying amounts of speeds. Recommend lifting light weights 3 times per week.  5. Continue to drink a lot of water to stay hydrated, feel full and protect your kidneys.  6. Next telephone visit in one month on 4/15/21 at 1:00 PM.    Subjective/Objective:     Jayesh Courtney is a 35 y.o. male referred by Liam Boyd MD. Diagnosed with Type 2 diabetes 1/12/21. Working from home. Lives with 7 people and 2 babies. He helps with the cooking.     Eating habits/recall: Stopped drinking sugar soda, fast food and sweets. Drinking a lot of water (8 cups plus/day)  Protein and vegetables. Not eating rice or noodles.  Occasionally orders at [employer] and gets a cauliflower rice bowl.  7:00 PM Dinner protein, green salad  Staying up later due to work (1:00 AM)    Activity: Sedentary most of the day. Trying to stand while working. Running errands, shadow boxing. Has free weights down in the basement    Weight at home: 394 lb  Wt Readings from Last 3 Encounters:   02/23/21 (!) 397 lb 11.2 oz (180.4 kg)   02/09/21 (!) 407 lb (184.6 kg)   02/03/21 (!) 412 lb (186.9 kg)     SMBG  pattern/BG ranges: Uses Accu-Chek Guide meter   Date Breakfast  Lunch  Dinner  Bedtime    Before After Before After Before After    3/10   104  86     3/9 104   86      3/8 99         3/7          3/6   97    94   3/5 99         3/4 110 98 92       3/3 107    86     3/2   97  87     3/1    91 89     2/28     92     2/27 2/26       102       Lab Results   Component Value Date    HGBA1C 6.9 (H) 01/12/2021       EDUCATION RECORD    Literature sent by email with permission  [employer] ([facility]) Understanding Diabetes  [facility] Healthy Snack list    Monitoring   Meter: Discussed  Monitoring: Discussed and Literature provided  BG goals: Discussed and Literature provided    Nutrition Management  Nutrition Management: Discussed and Literature provided  Weight: Assessed and Discussed  Portions/Balance: Assessed and Discussed  Carb ID/Count: Assessed, Discussed and Competent  Label Reading: Competent  Heart Healthy Fats: Competent    Physical Activity: Discussed and Literature provided    Medications: N/A    Diabetes Disease Process: Discussed    Acute Complications: Prevent, Detect, Treat:  Hypoglycemia: Discussed  Hyperglycemia: Discussed and Competent  Sick Days: Not addressed    Chronic Complications  Foot Care:Not addressed  Skin Care: Not addressed  Eye: Not addressed  ABC: Not addressed  Teeth:Not addressed  Goal Setting and Problem Solving: Discussed and Literature provided  Barriers: Assessed  Psychosocial Adjustments: Assessed    Time: 30 minutes   Previous Education: yes  Visit Type: Diabetes Self-Management Training ()  Diagnosis per referral: Type 2 diabetes without complication (E11.9)    Caroline Alicea RD, LD, [facility]  Certified Diabetes Care and   3/11/2021

## 2021-06-16 PROBLEM — R00.2 PALPITATIONS: Status: ACTIVE | Noted: 2021-01-22

## 2021-06-16 PROBLEM — E11.9 TYPE 2 DIABETES MELLITUS WITHOUT COMPLICATION, WITHOUT LONG-TERM CURRENT USE OF INSULIN (H): Status: ACTIVE | Noted: 2021-01-22

## 2021-06-16 PROBLEM — E66.01 MORBID OBESITY (H): Status: ACTIVE | Noted: 2021-01-12

## 2021-06-16 PROBLEM — R79.89 ABNORMAL LFTS: Status: ACTIVE | Noted: 2021-01-25

## 2021-06-16 PROBLEM — I10 BENIGN ESSENTIAL HYPERTENSION: Status: ACTIVE | Noted: 2021-01-22

## 2021-06-16 PROBLEM — G47.33 OSA (OBSTRUCTIVE SLEEP APNEA): Status: ACTIVE | Noted: 2021-01-22

## 2021-06-16 PROBLEM — I44.1 MOBITZ TYPE 1 SECOND DEGREE ATRIOVENTRICULAR BLOCK: Status: ACTIVE | Noted: 2021-01-22

## 2021-06-16 NOTE — TELEPHONE ENCOUNTER
Jayesh sent me blood sugars via email. All blood sugars are within target. He needs to cancel our telephone visit today due to a work conflict. Will request diabetes scheduling team to call and reschedule.

## 2021-06-18 NOTE — PATIENT INSTRUCTIONS - HE
Patient Instructions by Cathryn Lawrence MD at 2/9/2021 12:50 PM     Author: Cathryn Lawrence MD Service: -- Author Type: Physician    Filed: 2/9/2021  1:17 PM Encounter Date: 2/9/2021 Status: Signed    : Cathryn Lawrence MD (Physician)       Mr. Jayesh Courtney,     It was a pleasure to see you in the office today. My recommendations for you include:   1. Keep up with the diet  2. May stop carvedilol.  If blood pressure increases recommend increasing lisinopril to 30 mg daily.      Please do not hesitate to call the [facility] clinic with any questions or concerns at [phone].    Sincerely,

## 2021-06-18 NOTE — PATIENT INSTRUCTIONS - HE
Patient Instructions by Caroline Alicea RD, CDE at 2/11/2021  3:00 PM     Author: Caroline Alicea RD, CDE Service: -- Author Type: Diabetes Ed    Filed: 2/11/2021  5:32 PM Encounter Date: 2/11/2021 Status: Addendum    : Caroline Alicea RD, CDE (Registered Dietitian)    Related Notes: Original Note by Caroline Alicea RD, CDE (Registered Dietitian) filed at 2/11/2021  5:32 PM       1. Test blood sugar twice per day at different times. Key times are before breakfast, 2 hours after the largest meal or bedtime.  2. Eat smaller amounts more often. Avoid skipping meals.  3. Avoid sugary drinks (regular soda, lemonade, sweetened tea and Gatorade).  4. Eat fresh fruit instead of juice.  5. Include high fiber, whole grain foods instead of processed white foods. Healthier choices are whole grain cereals, whole wheat pasta, brown or wild rice and whole wheat bread.   6. Aim for foods with 3 grams of fiber or more per serving. Limit added sugar to 36 grams of added sugar per day.  7. Stay active every day; try not to sit for more than 30 minutes and walk 20-30 minutes most days of the week.   8. Next video visit on Thursday, 3/11/21 at 1:00 PM.    Blood sugar goals:  Before meals   1-2 hours after meals: less 180  Bedtime:     A1c: less than 7.0% (estimated average blood sugar of 154 mg/dl)    Patient Education     Managing Diabetes: The A1C Test       Healthy red blood cells have some glucose stuck to them. A high A1C means that unhealthy amounts of glucose are stuck to the cells.   What is the A1C test?  Using your meter helps you track your blood sugar every day. But your glucose meter tells you the value at the time of testing only. You also need to know if your treatment plan is keeping you healthy over time. The hemoglobin A1C (or glycated hemoglobin) test can help. This test measures your average blood sugar level over a few months. A higher A1C result means that you have a higher risk of developing  complications.  The A1C test  The A1C is a blood test done by your healthcare provider. You will likely have an A1C test every 3 to 6 months.  Your blood glucose goal  A1C has been shown as a percentage. But it can also be shown as a number representing the estimated Average Glucose (eAG). Unlike the A1C percentage, eAG is a number similar to the numbers listed on your daily glucose monitor. Both A1C and eAG measure the amount of glucose stuck to a protein called hemoglobin in red blood cells. Your healthcare provider will help you figure out what your ideal A1C or eAG should be. Your target number will depend on your age, general health, and other factors. If your current number is too high, your treatment plan may need changes, such as different medicines.  Sample results  Most people aim for an A1c lower than 7%. Thats an eAG less than 154 mg/dL. Or, your healthcare provider may want you to aim for an A1C of 6%. Thats an eAG of 126 mg/dL.     Glucose calculator  Visit [email or web address] for a chart that helps convert your A1C percentages into eAG numbers.   Date Last Reviewed: 6/1/2016 2000-2019 [employer]. [address] [place] [address], [place] [address]. All rights reserved. This information is not intended as a substitute for professional medical care. Always follow your healthcare professional's instructions.

## 2021-06-21 NOTE — LETTER
Letter by Pinky Weems CHW at      Author: Pinky Weems CHW Service: -- Author Type: --    Filed:  Encounter Date: 1/11/2021 Status: (Other)       CARE COORDINATION    January 12, 2021    Jayesh Courtney  [address] [id number]      Dear Jayesh,    I am a clinic community health worker who works with at [facility]. I have been trying to reach you recently to introduce Clinic Care Coordination and to see if there was anything I could assist you with.  Below is a description of clinic care coordination and how I can further assist you.      The clinic care coordination team is made up of a registered nurse,  and community health worker who understand the health care system. The goal of clinic care coordination is to help you manage your health and improve access to the health care system in the most efficient manner. The team can assist you in meeting your health care goals by providing education, coordinating services, strengthening the communication among your providers and supporting you with any resource needs.    Please feel free to contact me at [phone] with any questions or concerns. We are focused on providing you with the highest-quality healthcare experience possible and that all starts with you.     Sincerely,     HARI Mcdonough  Clinic Care Coordination   [phone]  [email or web address]

## 2021-06-27 ENCOUNTER — HEALTH MAINTENANCE LETTER (OUTPATIENT)
Age: 36
End: 2021-06-27

## 2021-06-30 NOTE — PROGRESS NOTES
"Progress Notes by Cathryn Lawrence MD at 1/12/2021  1:20 PM     Author: Cathryn Lawrence MD Service: -- Author Type: Physician    Filed: 1/12/2021  2:14 PM Encounter Date: 1/12/2021 Status: Signed    : Cathryn Lawrence MD (Physician)           Thank you, Dr. Tomas, for asking us to see Jayesh oCurtney at the [facility].      Assessment/Recommendations   Assessment:    1.  Hypertension: Newly diagnosed and poorly controlled  2.  Morbid obesity  3.  Likely untreated sleep apnea  4.  Palpitations  5.  Lower extremity edema    Plan:  1.  Magnesium, hemoglobin A1c due to elevated glucose over 200 on ER visit, BNP  2.  Echocardiogram to evaluate for structural heart disease  3.  1 patch 7-day monitor  4.  Referral to sleep study clinic  5.  Encouraged weight loss, diet and exercise  6.  Start carvedilol 6.25 mg twice daily.  Will likely need to further uptitrate meds and may need to use additional antihypertensive as well.  He has follow-up with his primary in about a week and we will plan on following up with him myself in a month.       History of Present Illness    Mr. Jayesh Courtney is a 35 y.o. male with history of morbid obesity who was recently in the ED on 1/8/2021 with palpitations and hypertension and referred here to rapid access clinic.  He woke up that morning feeling his heart beating \"hard\".  When he went to the ER his heart rate was running 120 to 130 bpm.  He was found to have a low magnesium of 1.7 otherwise labs including D-dimer and troponin were unremarkable.  He reports that occasionally he wakes up feeling these palpitations.  He feels it may be due to on treated sleep apnea.  He borrowed his brothers CPAP and felt better that night.  He denies any chest discomfort.  He has dyspnea with exertion unchanged.  He has notable edema on exam but did not notice it himself.       Physical Examination Review of Systems   Vitals:    01/12/21 1336   BP: 160/70   Pulse: " 64   Resp: 16     Body mass index is 63.9 kg/m .  Wt Readings from Last 3 Encounters:   01/12/21 (!) 432 lb 11.2 oz (196.3 kg)   01/08/21 (!) 428 lb (194.1 kg)   11/11/17 (!) 380 lb (172.4 kg)       General Appearance:   alert, no apparent distress   HEENT:  no scleral icterus; the mucous membranes are pink and moist                                  Neck: jugular venous pressure difficult to assess due to body habitus   Chest: the spine is straight and the chest is symmetric   Lungs:   respirations unlabored; the lungs are clear to auscultation   Cardiovascular:   regular rhythm with normal first and second heart sounds and no murmurs or gallops   Abdomen:  no organomegaly, masses, bruits, or tenderness; bowel sounds are present   Extremities: ++ edema   Skin: no xanthelasma    General: WNL  Eyes: WNL  Ears/Nose/Throat: WNL  Lungs: WNL  Heart: WNL  Stomach: WNL  Bladder: WNL  Muscle/Joints: WNL  Skin: WNL  Nervous System: WNL  Mental Health: WNL     Blood: WNL     Medical History  Surgical History Family History Social History   Morbid obesity No past surgical history on file.   Strong family history of hypertension Social History     Socioeconomic History   ? Marital status: Single     Spouse name: Not on file   ? Number of children: Not on file   ? Years of education: Not on file   ? Highest education level: Not on file   Occupational History   ? Not on file   Social Needs   ? Financial resource strain: Not on file   ? Food insecurity     Worry: Not on file     Inability: Not on file   ? Transportation needs     Medical: Not on file     Non-medical: Not on file   Tobacco Use   ? Smoking status: Never Smoker   ? Smokeless tobacco: Never Used   Substance and Sexual Activity   ? Alcohol use: No   ? Drug use: No   ? Sexual activity: Not on file   Lifestyle   ? Physical activity     Days per week: Not on file     Minutes per session: Not on file   ? Stress: Not on file   Relationships   ? Social connections     Talks  on phone: Not on file     Gets together: Not on file     Attends Sabianist service: Not on file     Active member of club or organization: Not on file     Attends meetings of clubs or organizations: Not on file     Relationship status: Not on file   ? Intimate partner violence     Fear of current or ex partner: Not on file     Emotionally abused: Not on file     Physically abused: Not on file     Forced sexual activity: Not on file   Other Topics Concern   ? Not on file   Social History Narrative   ? Not on file          Medications  Allergies   Current Outpatient Medications   Medication Sig Dispense Refill   ? carvediloL (COREG) 6.25 MG tablet Take 1 tablet (6.25 mg total) by mouth 2 (two) times a day with meals. 60 tablet 3     No current facility-administered medications for this visit.       No Known Allergies      Lab Results    Chemistry/lipid CBC Cardiac Enzymes/BNP/TSH/INR   Lab Results   Component Value Date    CREATININE 0.87 01/08/2021    BUN 9 01/08/2021    K 4.1 01/08/2021     01/08/2021     01/08/2021    CO2 24 01/08/2021    Lab Results   Component Value Date    WBC 9.1 01/08/2021    HGB 14.5 01/08/2021    HCT 45.7 01/08/2021    MCV 86 01/08/2021     01/08/2021    Lab Results   Component Value Date    TROPONINI 0.02 01/08/2021    TSH 3.94 01/08/2021

## 2021-10-17 ENCOUNTER — HEALTH MAINTENANCE LETTER (OUTPATIENT)
Age: 36
End: 2021-10-17

## 2021-11-09 ENCOUNTER — OFFICE VISIT (OUTPATIENT)
Dept: FAMILY MEDICINE | Facility: CLINIC | Age: 36
End: 2021-11-09
Payer: COMMERCIAL

## 2021-11-09 VITALS
SYSTOLIC BLOOD PRESSURE: 136 MMHG | WEIGHT: 315 LBS | BODY MASS INDEX: 53.11 KG/M2 | HEART RATE: 75 BPM | DIASTOLIC BLOOD PRESSURE: 84 MMHG | OXYGEN SATURATION: 99 %

## 2021-11-09 DIAGNOSIS — E11.9 TYPE 2 DIABETES MELLITUS WITHOUT COMPLICATION, WITHOUT LONG-TERM CURRENT USE OF INSULIN (H): ICD-10-CM

## 2021-11-09 DIAGNOSIS — R79.89 ABNORMAL LFTS: ICD-10-CM

## 2021-11-09 DIAGNOSIS — G57.02 PIRIFORMIS SYNDROME, LEFT: Primary | ICD-10-CM

## 2021-11-09 LAB
ALBUMIN SERPL-MCNC: 4.3 G/DL (ref 3.5–5)
ALP SERPL-CCNC: 67 U/L (ref 45–120)
ALT SERPL W P-5'-P-CCNC: 22 U/L (ref 0–45)
ANION GAP SERPL CALCULATED.3IONS-SCNC: 12 MMOL/L (ref 5–18)
AST SERPL W P-5'-P-CCNC: 22 U/L (ref 0–40)
BILIRUB SERPL-MCNC: 1 MG/DL (ref 0–1)
BUN SERPL-MCNC: 11 MG/DL (ref 8–22)
CALCIUM SERPL-MCNC: 9.7 MG/DL (ref 8.5–10.5)
CHLORIDE BLD-SCNC: 102 MMOL/L (ref 98–107)
CO2 SERPL-SCNC: 25 MMOL/L (ref 22–31)
CREAT SERPL-MCNC: 0.79 MG/DL (ref 0.7–1.3)
GFR SERPL CREATININE-BSD FRML MDRD: >90 ML/MIN/1.73M2
GLUCOSE BLD-MCNC: 87 MG/DL (ref 70–125)
HBA1C MFR BLD: 5.1 % (ref 0–5.6)
POTASSIUM BLD-SCNC: 4.3 MMOL/L (ref 3.5–5)
PROT SERPL-MCNC: 7.9 G/DL (ref 6–8)
SODIUM SERPL-SCNC: 139 MMOL/L (ref 136–145)

## 2021-11-09 PROCEDURE — 83036 HEMOGLOBIN GLYCOSYLATED A1C: CPT | Performed by: NURSE PRACTITIONER

## 2021-11-09 PROCEDURE — 80053 COMPREHEN METABOLIC PANEL: CPT | Performed by: NURSE PRACTITIONER

## 2021-11-09 PROCEDURE — 99213 OFFICE O/P EST LOW 20 MIN: CPT | Performed by: NURSE PRACTITIONER

## 2021-11-09 PROCEDURE — 36415 COLL VENOUS BLD VENIPUNCTURE: CPT | Performed by: NURSE PRACTITIONER

## 2021-11-09 RX ORDER — LANCETS
EACH MISCELLANEOUS
COMMUNITY
Start: 2021-03-09

## 2021-11-09 RX ORDER — IBUPROFEN 600 MG/1
TABLET, FILM COATED ORAL
COMMUNITY
Start: 2021-08-11 | End: 2022-02-15

## 2021-11-09 NOTE — PROGRESS NOTES
"  Assessment & Plan     Piriformis syndrome, left  Supportive measures discussed. If he does not see much improvement over the next 4 weeks with suggested exercises, we may need to place a referral to PT. No red flag symptoms today suggesting cauda equina syndrome or others.    Type 2 diabetes mellitus without complication, without long-term current use of insulin (H)  He wants an A1c rechecked. Last checked at 6.9. He is not currently using any diabetes medications  - Hemoglobin A1c; Future    Abnormal LFTs  ALT was elevated in the past. He wants his lab work rechecked today. Suspect nonalcoholic steatohepatitis. He needs to lose a significant amount of weight.  - Comprehensive metabolic panel; Future    Patient Instructions   Aleve 500 mg twice daily with food for 2 weeks.    Heating pad on your buttocks for at least 20 minutes 2-3 times daily with gentle stretching afterward.    Leg across the thigh and lean forward.    Tennis ball stretch.    Look up some exercises on YouTube.    Get up and move around more often during the day.    We will recheck some labs today but you need to follow-up with Dr. Boyd.      Review of external notes as documented elsewhere in note  21 minutes spent on the date of the encounter doing chart review, history and exam, documentation and further activities per the note     BMI:   Estimated body mass index is 53.11 kg/m  as calculated from the following:    Height as of 2/9/21: 1.746 m (5' 8.75\").    Weight as of this encounter: 162 kg (357 lb 1 oz).   Weight management plan: Patient was referred to their PCP to discuss a diet and exercise plan.    See Patient Instructions    Return in about 1 month (around 12/9/2021) for Follow up.    Mathieu Calhoun, [facility]    [place] Mcconnell is a 35 year old who presents for the following health issues     HPI     Patient comes to the clinic today with left buttock pain that started approximately 4 " "months ago. He was washing his car aggressively when he felt a \"twinge\" in his left butt cheek. Since that time, he has had intermittent discomfort in his left buttock that at times radiates down his left leg.    He is try doing some stretching intermittently and has only seen moderate success.    Admits that he is quite sedentary and sits for most of the day.    No loss of bowel or bladder function. No abnormal fevers. No foot drop.    He has a history of type 2 diabetes and would like his A1c rechecked today.    He has a history of elevated transaminases and would like this rechecked today as well. He does not have any follow-up appointment set up with his PCP right now      Review of Systems   Constitutional, HEENT, cardiovascular, pulmonary, gi and gu systems are negative, except as otherwise noted.      Objective    /84 (BP Location: Right arm, Patient Position: Sitting, Cuff Size: Adult Large)   Pulse 75   Wt (!) 162 kg (357 lb 1 oz)   SpO2 99%   BMI 53.11 kg/m    Body mass index is 53.11 kg/m .  Physical Exam     No pain with deep palpation to the left gluteal musculature  "

## 2021-11-09 NOTE — PATIENT INSTRUCTIONS
Aleve 500 mg twice daily with food for 2 weeks.    Heating pad on your buttocks for at least 20 minutes 2-3 times daily with gentle stretching afterward.    Leg across the thigh and lean forward.    Tennis ball stretch.    Look up some exercises on YouTube.    Get up and move around more often during the day.    We will recheck some labs today but you need to follow-up with Dr. Boyd.

## 2021-12-02 ENCOUNTER — VIRTUAL VISIT (OUTPATIENT)
Dept: INTERNAL MEDICINE | Facility: CLINIC | Age: 36
End: 2021-12-02
Payer: COMMERCIAL

## 2021-12-02 DIAGNOSIS — G57.02 PIRIFORMIS SYNDROME, LEFT: Primary | ICD-10-CM

## 2021-12-02 PROCEDURE — 99213 OFFICE O/P EST LOW 20 MIN: CPT | Mod: TEL | Performed by: INTERNAL MEDICINE

## 2021-12-02 NOTE — PROGRESS NOTES
Jayesh is a 35 year old who is being evaluated via a billable video visit.      How would you like to obtain your AVS? MyChart  If the video visit is dropped, the invitation should be resent by: Text to cell phone: [phone]  Will anyone else be joining your video visit? No        Subjective     Telephone visit to follow-up on ongoing left buttock, thigh, radiating to the calf pain, which I think could indeed be Left-sided piriformis syndrome.  He was seen in clinic November 9, 2021, no back pain, symptoms and exam suggestive of piriformis syndrome.    I agree with Kd Calhoun suggestion that Mr. Sharpe should try a course of formal physical therapy, so I have placed that request.  Mr. Courtney told me that ibuprofen and naproxen have not been particularly helpful, therefore I told him to stop those.    He has made wonderful progress with weight loss, 70 pounds between January 2021 and November 9, 2021.  He was able to normalize his A1c, and therefore does not need diabetes medication.    I asked him to schedule a follow-up visit with me in early 2022, and I expect that his metabolic parameters will have improved.  He continues on lisinopril for blood pressure.    Objective       Wt Readings from Last 5 Encounters:   11/09/21 (!) 162 kg (357 lb 1 oz)   02/23/21 (!) 180.4 kg (397 lb 11.2 oz)   02/09/21 (!) 184.6 kg (407 lb)   01/22/21 (!) 186.9 kg (412 lb)   01/12/21 (!) 196.3 kg (432 lb 11.2 oz)     Telephone visit 10 minutes

## 2022-01-23 ENCOUNTER — OFFICE VISIT (OUTPATIENT)
Dept: FAMILY MEDICINE | Facility: CLINIC | Age: 37
End: 2022-01-23
Payer: COMMERCIAL

## 2022-01-23 ENCOUNTER — MYC MEDICAL ADVICE (OUTPATIENT)
Dept: INTERNAL MEDICINE | Facility: CLINIC | Age: 37
End: 2022-01-23

## 2022-01-23 VITALS
HEART RATE: 78 BPM | DIASTOLIC BLOOD PRESSURE: 87 MMHG | OXYGEN SATURATION: 99 % | BODY MASS INDEX: 51.47 KG/M2 | SYSTOLIC BLOOD PRESSURE: 153 MMHG | RESPIRATION RATE: 20 BRPM | TEMPERATURE: 98.4 F | WEIGHT: 315 LBS

## 2022-01-23 DIAGNOSIS — R21 RASH AND NONSPECIFIC SKIN ERUPTION: Primary | ICD-10-CM

## 2022-01-23 DIAGNOSIS — B00.1 COLD SORE: Primary | ICD-10-CM

## 2022-01-23 PROCEDURE — 99213 OFFICE O/P EST LOW 20 MIN: CPT | Performed by: FAMILY MEDICINE

## 2022-01-23 NOTE — PROGRESS NOTES
Assessment & Plan     Rash and nonspecific skin eruption    Patient reassured that appearance of genitalia looks completely normal without any evidence of abnormality or infection.  If skin feels dry in certain areas- may use Aquaphor or CeraVe to lubricate skin prn.  Close Follow-up if any new lesions prn.    Anna Oliva MD  [facility] RUFINA Mcconnell is a 36 year old who presents for the following health issues     HPI     Patient presents with patches of drier skin noted on shaft of penis this week.  No blisters or raised lesions/warts.    Sexually active.    Type 2 DM.    Review of Systems   Constitutional, HEENT, cardiovascular, pulmonary, GI, , musculoskeletal, neuro, skin, endocrine and psych systems are negative, except as otherwise noted.      Objective    BP (!) 153/87   Pulse 78   Temp 98.4  F (36.9  C) (Oral)   Resp 20   Wt (!) 156.9 kg (346 lb)   SpO2 99%   BMI 51.47 kg/m    Body mass index is 51.47 kg/m .  Physical Exam   GENERAL: healthy, alert and no distress  EYES: Eyes grossly normal to inspection, PERRL and conjunctivae and sclerae normal   (male): normal male genitalia without lesions or urethral discharge, no hernia- with obese body habitus- penis is retracted back into pannus-- needs to push pannus in for exam of penis contributing to possible skin changes noted on penis  MS: no gross musculoskeletal defects noted, no edema  SKIN: no suspicious lesions or rashes  PSYCH: mentation appears normal, affect normal/bright

## 2022-01-25 RX ORDER — VALACYCLOVIR HYDROCHLORIDE 1 G/1
2000 TABLET, FILM COATED ORAL 2 TIMES DAILY
Qty: 4 TABLET | Refills: 0 | Status: SHIPPED | OUTPATIENT
Start: 2022-01-25 | End: 2022-02-15

## 2022-01-26 DIAGNOSIS — E11.9 TYPE 2 DIABETES MELLITUS WITHOUT COMPLICATION, WITHOUT LONG-TERM CURRENT USE OF INSULIN (H): ICD-10-CM

## 2022-01-26 DIAGNOSIS — I10 BENIGN ESSENTIAL HYPERTENSION: ICD-10-CM

## 2022-01-27 RX ORDER — LISINOPRIL 20 MG/1
TABLET ORAL
Qty: 90 TABLET | Refills: 3 | Status: SHIPPED | OUTPATIENT
Start: 2022-01-27 | End: 2023-11-22

## 2022-02-11 ENCOUNTER — TRANSFERRED RECORDS (OUTPATIENT)
Dept: HEALTH INFORMATION MANAGEMENT | Facility: CLINIC | Age: 37
End: 2022-02-11
Payer: COMMERCIAL

## 2022-02-11 LAB — RETINOPATHY: NEGATIVE

## 2022-02-15 ENCOUNTER — OFFICE VISIT (OUTPATIENT)
Dept: INTERNAL MEDICINE | Facility: CLINIC | Age: 37
End: 2022-02-15
Payer: COMMERCIAL

## 2022-02-15 VITALS
HEART RATE: 65 BPM | WEIGHT: 315 LBS | DIASTOLIC BLOOD PRESSURE: 72 MMHG | TEMPERATURE: 98.6 F | HEIGHT: 69 IN | OXYGEN SATURATION: 99 % | SYSTOLIC BLOOD PRESSURE: 130 MMHG | BODY MASS INDEX: 46.65 KG/M2

## 2022-02-15 DIAGNOSIS — E11.9 TYPE 2 DIABETES MELLITUS WITHOUT COMPLICATION, WITHOUT LONG-TERM CURRENT USE OF INSULIN (H): ICD-10-CM

## 2022-02-15 DIAGNOSIS — G47.33 OSA (OBSTRUCTIVE SLEEP APNEA): ICD-10-CM

## 2022-02-15 DIAGNOSIS — Z00.00 ROUTINE GENERAL MEDICAL EXAMINATION AT A HEALTH CARE FACILITY: Primary | ICD-10-CM

## 2022-02-15 DIAGNOSIS — I10 BENIGN ESSENTIAL HYPERTENSION: ICD-10-CM

## 2022-02-15 DIAGNOSIS — N48.1 BALANITIS CIRCINATA: ICD-10-CM

## 2022-02-15 DIAGNOSIS — E66.01 MORBID OBESITY (H): ICD-10-CM

## 2022-02-15 DIAGNOSIS — B00.1 COLD SORE: ICD-10-CM

## 2022-02-15 LAB
ALBUMIN SERPL-MCNC: 4.5 G/DL (ref 3.5–5)
ALP SERPL-CCNC: 61 U/L (ref 45–120)
ALT SERPL W P-5'-P-CCNC: 25 U/L (ref 0–45)
ANION GAP SERPL CALCULATED.3IONS-SCNC: 14 MMOL/L (ref 5–18)
AST SERPL W P-5'-P-CCNC: 27 U/L (ref 0–40)
BILIRUB SERPL-MCNC: 1.1 MG/DL (ref 0–1)
BUN SERPL-MCNC: 9 MG/DL (ref 8–22)
CALCIUM SERPL-MCNC: 9.6 MG/DL (ref 8.5–10.5)
CHLORIDE BLD-SCNC: 104 MMOL/L (ref 98–107)
CHOLEST SERPL-MCNC: 191 MG/DL
CO2 SERPL-SCNC: 20 MMOL/L (ref 22–31)
CREAT SERPL-MCNC: 0.78 MG/DL (ref 0.7–1.3)
ERYTHROCYTE [DISTWIDTH] IN BLOOD BY AUTOMATED COUNT: 13.3 % (ref 10–15)
FASTING STATUS PATIENT QL REPORTED: NORMAL
GFR SERPL CREATININE-BSD FRML MDRD: >90 ML/MIN/1.73M2
GLUCOSE BLD-MCNC: 85 MG/DL (ref 70–125)
HBA1C MFR BLD: 5.4 % (ref 0–5.6)
HCT VFR BLD AUTO: 47.7 % (ref 40–53)
HDLC SERPL-MCNC: 43 MG/DL
HGB BLD-MCNC: 15.5 G/DL (ref 13.3–17.7)
LDLC SERPL CALC-MCNC: 127 MG/DL
MCH RBC QN AUTO: 26.9 PG (ref 26.5–33)
MCHC RBC AUTO-ENTMCNC: 32.5 G/DL (ref 31.5–36.5)
MCV RBC AUTO: 83 FL (ref 78–100)
PLATELET # BLD AUTO: 166 10E3/UL (ref 150–450)
POTASSIUM BLD-SCNC: 4.2 MMOL/L (ref 3.5–5)
PROT SERPL-MCNC: 8.1 G/DL (ref 6–8)
RBC # BLD AUTO: 5.76 10E6/UL (ref 4.4–5.9)
SODIUM SERPL-SCNC: 138 MMOL/L (ref 136–145)
TRIGL SERPL-MCNC: 103 MG/DL
TSH SERPL DL<=0.005 MIU/L-ACNC: 1.75 UIU/ML (ref 0.3–5)
WBC # BLD AUTO: 8.4 10E3/UL (ref 4–11)

## 2022-02-15 PROCEDURE — 80061 LIPID PANEL: CPT | Performed by: INTERNAL MEDICINE

## 2022-02-15 PROCEDURE — 80053 COMPREHEN METABOLIC PANEL: CPT | Performed by: INTERNAL MEDICINE

## 2022-02-15 PROCEDURE — 90471 IMMUNIZATION ADMIN: CPT | Performed by: INTERNAL MEDICINE

## 2022-02-15 PROCEDURE — 90686 IIV4 VACC NO PRSV 0.5 ML IM: CPT | Performed by: INTERNAL MEDICINE

## 2022-02-15 PROCEDURE — 85027 COMPLETE CBC AUTOMATED: CPT | Performed by: INTERNAL MEDICINE

## 2022-02-15 PROCEDURE — 99395 PREV VISIT EST AGE 18-39: CPT | Mod: 25 | Performed by: INTERNAL MEDICINE

## 2022-02-15 PROCEDURE — 84443 ASSAY THYROID STIM HORMONE: CPT | Performed by: INTERNAL MEDICINE

## 2022-02-15 PROCEDURE — 83036 HEMOGLOBIN GLYCOSYLATED A1C: CPT | Performed by: INTERNAL MEDICINE

## 2022-02-15 PROCEDURE — 36415 COLL VENOUS BLD VENIPUNCTURE: CPT | Performed by: INTERNAL MEDICINE

## 2022-02-15 RX ORDER — NYSTATIN 100000 [USP'U]/G
POWDER TOPICAL 2 TIMES DAILY
Qty: 60 G | Refills: 4 | Status: SHIPPED | OUTPATIENT
Start: 2022-02-15 | End: 2023-01-20

## 2022-02-15 RX ORDER — VALACYCLOVIR HYDROCHLORIDE 1 G/1
2000 TABLET, FILM COATED ORAL 2 TIMES DAILY
Qty: 8 TABLET | Refills: 3 | Status: SHIPPED | OUTPATIENT
Start: 2022-02-15 | End: 2023-01-20

## 2022-02-15 ASSESSMENT — MIFFLIN-ST. JEOR: SCORE: 2483.72

## 2022-02-15 NOTE — PROGRESS NOTES
SUBJECTIVE:   CC: Jayesh Courtney is an 36 year old male who presents for preventative health visit.       Patient has been advised of split billing requirements and indicates understanding: Yes  Healthy Habits:     Getting at least 3 servings of Calcium per day:  Yes    Bi-annual eye exam:  Yes    Dental care twice a year:  NO    Sleep apnea or symptoms of sleep apnea:  Sleep apnea    Diet:  Regular (no restrictions)    Frequency of exercise:  1 day/week    Duration of exercise:  15-30 minutes    Taking medications regularly:  Yes    Barriers to taking medications:  None    Medication side effects:  None    PHQ-2 Total Score: 0    Additional concerns today:  No      Today's PHQ-2 Score:   PHQ-2 ( 1999 Pfizer) 2/15/2022   Q1: Little interest or pleasure in doing things 0   Q2: Feeling down, depressed or hopeless 0   PHQ-2 Score 0   Q1: Little interest or pleasure in doing things Not at all   Q2: Feeling down, depressed or hopeless Not at all   PHQ-2 Score 0         Do you feel safe in your environment? Yes    Have you ever done Advance Care Planning? (For example, a Health Directive, POLST, or a discussion with a medical provider or your loved ones about your wishes): No, advance care planning information given to patient to review.  Patient plans to discuss their wishes with loved ones or provider.      Social History     Tobacco Use     Smoking status: Never Smoker     Smokeless tobacco: Never Used   Substance Use Topics     Alcohol use: No         Alcohol Use 2/15/2022   Prescreen: >3 drinks/day or >7 drinks/week? Not Applicable       Last PSA: No results found for: PSA    Reviewed orders with patient. Reviewed health maintenance and updated orders accordingly - Yes  Lab work is in process    Reviewed and updated as needed this visit by clinical staff  Tobacco  Allergies  Meds             Reviewed and updated as needed this visit by Provider    Meds                Review of Systems  CONSTITUTIONAL: NEGATIVE for  "fever, chills, change in weight  INTEGUMENTARY/SKIN: NEGATIVE for worrisome rashes, moles or lesions  EYES: NEGATIVE for vision changes or irritation  ENT: NEGATIVE for ear, mouth and throat problems  RESP: NEGATIVE for significant cough or SOB  CV: NEGATIVE for chest pain, palpitations or peripheral edema  GI: NEGATIVE for nausea, abdominal pain, heartburn, or change in bowel habits   male: negative for dysuria, hematuria, decreased urinary stream, erectile dysfunction, urethral discharge  MUSCULOSKELETAL: NEGATIVE for significant arthralgias or myalgia  NEURO: NEGATIVE for weakness, dizziness or paresthesias  PSYCHIATRIC: NEGATIVE for changes in mood or affect    OBJECTIVE:   /72 (BP Location: Right arm, Patient Position: Sitting, Cuff Size: Adult Large)   Pulse 65   Temp 98.6  F (37  C) (Oral)   Ht 1.74 m (5' 8.5\")   Wt (!) 156.1 kg (344 lb 3.2 oz)   SpO2 99%   BMI 51.57 kg/m      Physical Exam    General: Alert, in no distress  + Severely overweight  Skin: No significant lesion seen.  Eyes/nose/throat: Eyes without scleral icterus, eye movements normal, pupils equal and reactive, oropharynx clear, ears with normal TM's  MSK: Neck with good ROM  Lymphatic: Neck without adenopathy or masses  Endocrine: Thyroid with no nodules to palpation  Pulm: Lungs clear to auscultation bilaterally  Cardiac: Heart with regular rate and rhythm, no murmur or gallop  GI: Abdomen obese, soft, nontender. No palpable enlargement of liver or spleen  MSK: Extremities no tenderness or edema  Neuro: Moves all extremities, without focal weakness  Psych: Alert, normal mental status. Normal affect and speech    : + Mild erythema and a tiny amount of discharge around the glans penis, consistent with balanitis    ASSESSMENT/PLAN:     Annual preventive exam, Mr. Courtney has been doing pretty well since our last preventive visit of February 23, 2021, a year ago    He still managing to lose weight, although things have slowed down a " bit, and he plans to refocus his efforts and try to drop 45 pounds and get below 300 pounds by the end of 2022.  He also got started on CPAP to treat severe sleep apnea, and notes dramatic symptomatic benefit (detailed below)    He cut out rice, sweets, overall size of meals, and focusing on eating more vegetables.    Already we see the beneficial metabolic effects of the weight loss that he has accomplished thus far.  His liver enzymes have normalized, as has his A1c blood test.    He is fasting this afternoon, so we will send him for comprehensive metabolic panel, lipid profile, A1c, thyroid cascade.    He continues to work as a     Issue by issue:    Cold sores, manifesting as small upper lip ulcers  We will keep him on Valtrex using 1 g capsule, take 2 capsules equals 2000 mg twice on the day of an attack.  This is a 1 day course.    Mild case of yeast balanitis, will have him use nystatin powder once or twice a day on a as needed basis, and I also reminded him to try to keep that area dry if he can.     Type 2 diabetes but A1c appears to have normalized as of November 9, 2021 as he has attack to the obesity problem   Body mass index 51.57 as of February 15, 2022, down from a BMI of 61     New diagnosis A1c 6.9 January 12, 2021  He had an eye exam done in February 2022, and told that there were no signs of retinopathy    11-9-2021  Hemoglobin A1C 0.0 - 5.6 % 5.1      He saw Diabetes Education.  He does not have any symptoms of neuropathy, at this point there does not appear to be any endorgan damage.      I told him that he can check his blood sugars less often, since it seems like his sugars are under good control without medication.    Wt Readings from Last 5 Encounters:   02/15/22 (!) 156.1 kg (344 lb 3.2 oz)   01/23/22 (!) 156.9 kg (346 lb)   11/09/21 (!) 162 kg (357 lb 1 oz)   02/23/21 (!) 180.4 kg (397 lb 11.2 oz)   02/09/21 (!) 184.6 kg (407 lb)     Piriformis syndrome left lower  "extremity, which was the topic of our virtual visit meeting on December 2, 2021, he reports this is better, just a trace of tingling.    No longer having Palpitations, probably because his sleep apnea is treated now  Event monitor study January 21, 2021 showing 3 occasions of Mobitz type I second-degree AV block, and also sinus arrhythmia with bradycardia down to 25 bpm, in the context of likely severe obstructive sleep apnea; carvedilol stopped for that reason; mild left ventricular wall thickening seen on echocardiogram February 3, 2021      Carvedilol is been stopped.  His echocardiogram showed good systolic function, although there was mild concentric increase in left ventricular wall thickness observed.      At May 20 to continue to focus on blood pressure, and treating sleep apnea.     Obstructive sleep apnea, excellent response to CPAP  He reports good results \"Like a brand new man\"    Hypertension in the context of obesity, type 2 diabetes, and obstructive sleep apnea, on lisinopril 20 mg a day,  Carvedilol stoppped.   He already purchased home blood pressure machine with a big cuff.     Trace Lower extremity edema, related to obesity     Liver enzymes ALT and AST, normalized as of Novemer 9, 2021   (H) 01/22/2021   AST 83 (H) 01/22/2021     Low HDL (good cholesterol)  I expect this to also improve in response to his lifestyle measures.  Lets also check this in 6 months, same time as recheck of liver chemistries.    CHOL 172 01/22/2021     HDL 39 (L) 01/22/2021     LDLCALC 118 01/22/2021     TRIG 77 01/22/2021     COVID-19 vaccine up to date  Immunization History   Administered Date(s) Administered     COVID-19,PF,Pfizer (12+ Yrs) 04/14/2021, 05/05/2021, 12/03/2021     Influenza (H1N1) 01/19/2010     Influenza Quad, Recombinant, pf(RIV4) (Flublok) 01/22/2021     MMR 05/23/1990     Tdap (Adacel,Boostrix) 01/22/2021       COUNSELING:   Reviewed preventive health counseling, as reflected in patient " "instructions       Healthy diet/nutrition    Estimated body mass index is 51.57 kg/m  as calculated from the following:    Height as of this encounter: 1.74 m (5' 8.5\").    Weight as of this encounter: 156.1 kg (344 lb 3.2 oz).     Weight management plan: Discussed healthy diet and exercise guidelines    He reports that he has never smoked. He has never used smokeless tobacco.    NOAH LANDEROS MD  [facility]  "

## 2022-02-15 NOTE — PATIENT INSTRUCTIONS
Annual preventive exam, Mr. Courtney has been doing pretty well since our last preventive visit of February 23, 2021, a year ago    He still managing to lose weight, although things have slowed down a bit, and he plans to refocus his efforts and try to drop 45 pounds and get below 300 pounds by the end of 2022.  He also got started on CPAP to treat severe sleep apnea, and notes dramatic symptomatic benefit (detailed below)    He cut out rice, sweets, overall size of meals, and focusing on eating more vegetables.    Already we see the beneficial metabolic effects of the weight loss that he has accomplished thus far.  His liver enzymes have normalized, as has his A1c blood test.    He is fasting this afternoon, so we will send him for comprehensive metabolic panel, lipid profile, A1c, thyroid cascade.    He continues to work as a     Issue by issue:    Cold sores, manifesting as small upper lip ulcers  We will keep him on Valtrex using 1 g capsule, take 2 capsules equals 2000 mg twice on the day of an attack.  This is a 1 day course.    Mild case of yeast balanitis, will have him use nystatin powder once or twice a day on a as needed basis, and I also reminded him to try to keep that area dry if he can.     Type 2 diabetes but A1c appears to have normalized as of November 9, 2021 as he has attack to the obesity problem   Body mass index 51.57 as of February 15, 2022, down from a BMI of 61     New diagnosis A1c 6.9 January 12, 2021  He had an eye exam done in February 2022, and told that there were no signs of retinopathy    11-9-2021  Hemoglobin A1C 0.0 - 5.6 % 5.1      He saw Diabetes Education.  He does not have any symptoms of neuropathy, at this point there does not appear to be any endorgan damage.      I told him that he can check his blood sugars less often, since it seems like his sugars are under good control without medication.    Wt Readings from Last 5 Encounters:   02/15/22 (!) 156.1 kg  "(344 lb 3.2 oz)   01/23/22 (!) 156.9 kg (346 lb)   11/09/21 (!) 162 kg (357 lb 1 oz)   02/23/21 (!) 180.4 kg (397 lb 11.2 oz)   02/09/21 (!) 184.6 kg (407 lb)     Piriformis syndrome left lower extremity, which was the topic of our virtual visit meeting on December 2, 2021, he reports this is better, just a trace of tingling.    No longer having Palpitations, probably because his sleep apnea is treated now  Event monitor study January 21, 2021 showing 3 occasions of Mobitz type I second-degree AV block, and also sinus arrhythmia with bradycardia down to 25 bpm, in the context of likely severe obstructive sleep apnea; carvedilol stopped for that reason; mild left ventricular wall thickening seen on echocardiogram February 3, 2021      Carvedilol is been stopped.  His echocardiogram showed good systolic function, although there was mild concentric increase in left ventricular wall thickness observed.      At May 20 to continue to focus on blood pressure, and treating sleep apnea.     Obstructive sleep apnea, excellent response to CPAP  He reports good results \"Like a brand new man\"    Hypertension in the context of obesity, type 2 diabetes, and obstructive sleep apnea, on lisinopril 20 mg a day,  Carvedilol stoppped.   He already purchased home blood pressure machine with a big cuff.     Trace Lower extremity edema, related to obesity     Liver enzymes ALT and AST, normalized as of Novemer 9, 2021   (H) 01/22/2021   AST 83 (H) 01/22/2021     Low HDL (good cholesterol)  I expect this to also improve in response to his lifestyle measures.  Lets also check this in 6 months, same time as recheck of liver chemistries.    CHOL 172 01/22/2021     HDL 39 (L) 01/22/2021     LDLCALC 118 01/22/2021     TRIG 77 01/22/2021     COVID-19 vaccine up to date  Immunization History   Administered Date(s) Administered     COVID-19,PF,Pfizer (12+ Yrs) 04/14/2021, 05/05/2021, 12/03/2021     Influenza (H1N1) 01/19/2010     Influenza " Quad, Recombinant, pf(RIV4) (Flublok) 01/22/2021     MMR 05/23/1990     Tdap (Adacel,Boostrix) 01/22/2021

## 2022-03-01 ENCOUNTER — HOSPITAL ENCOUNTER (EMERGENCY)
Facility: CLINIC | Age: 37
Discharge: HOME | End: 2022-03-01
Attending: STUDENT IN AN ORGANIZED HEALTH CARE EDUCATION/TRAINING PROGRAM | Admitting: STUDENT IN AN ORGANIZED HEALTH CARE EDUCATION/TRAINING PROGRAM
Payer: COMMERCIAL

## 2022-03-01 VITALS
TEMPERATURE: 98.3 F | SYSTOLIC BLOOD PRESSURE: 141 MMHG | HEIGHT: 68 IN | BODY MASS INDEX: 47.74 KG/M2 | DIASTOLIC BLOOD PRESSURE: 73 MMHG | WEIGHT: 315 LBS | HEART RATE: 74 BPM | OXYGEN SATURATION: 97 %

## 2022-03-01 DIAGNOSIS — R82.81 PYURIA: ICD-10-CM

## 2022-03-01 DIAGNOSIS — R31.9 HEMATURIA, UNSPECIFIED TYPE: ICD-10-CM

## 2022-03-01 LAB
ALBUMIN UR-MCNC: 70 MG/DL
APPEARANCE UR: ABNORMAL
BILIRUB UR QL STRIP: NEGATIVE
COLOR UR AUTO: ABNORMAL
GLUCOSE UR STRIP-MCNC: NEGATIVE MG/DL
HGB UR QL STRIP: ABNORMAL
KETONES UR STRIP-MCNC: NEGATIVE MG/DL
LEUKOCYTE ESTERASE UR QL STRIP: ABNORMAL
MUCOUS THREADS #/AREA URNS LPF: PRESENT /LPF
NITRATE UR QL: NEGATIVE
PH UR STRIP: 5.5 [PH] (ref 5–7)
RBC URINE: >182 /HPF
SP GR UR STRIP: 1.01 (ref 1–1.03)
SQUAMOUS EPITHELIAL: 1 /HPF
TRANSITIONAL EPI: 1 /HPF
UROBILINOGEN UR STRIP-MCNC: <2 MG/DL
WBC CLUMPS #/AREA URNS HPF: PRESENT /HPF
WBC URINE: >182 /HPF

## 2022-03-01 PROCEDURE — 81001 URINALYSIS AUTO W/SCOPE: CPT | Performed by: STUDENT IN AN ORGANIZED HEALTH CARE EDUCATION/TRAINING PROGRAM

## 2022-03-01 PROCEDURE — 87186 SC STD MICRODIL/AGAR DIL: CPT | Performed by: STUDENT IN AN ORGANIZED HEALTH CARE EDUCATION/TRAINING PROGRAM

## 2022-03-01 PROCEDURE — 99283 EMERGENCY DEPT VISIT LOW MDM: CPT

## 2022-03-01 RX ORDER — CIPROFLOXACIN 500 MG/1
500 TABLET, FILM COATED ORAL 2 TIMES DAILY
Qty: 14 TABLET | Refills: 0 | Status: SHIPPED | OUTPATIENT
Start: 2022-03-01 | End: 2022-03-08

## 2022-03-01 NOTE — ED TRIAGE NOTES
Pt reports around 0000 he had masturbated when he believes he pressed too hard on his pelvic area and immediately following developed hematuria. He denies pain in the pelvic region at this time, but does endorse feelings of urinary urgency. HX of HTN; /95.

## 2022-03-01 NOTE — ED PROVIDER NOTES
EMERGENCY DEPARTMENT ENCOUNTER       ED Course & Medical Decision Making     3:20 AM I met with the patient to gather history and to perform my initial exam. We discussed plans for the ED course, including diagnostic testing and treatment.   4:04 AM I discussed the case with Dr. Alejandra from urology.   4:12 AM I rechecked and updated the patient with results and the plan for discharge with urology follow up.   PPE worn: n95 mask, glasses.     Final Impression  36 year old male presents for evaluation of hematuria after masturbation this evening.  States that right around the time he was about to climax he pressed down in his suprapubic region (just above where the penis arises from the skin of the lower pelvis, not below).  Shortly after this while voiding he noticed blood in his urine.  No reported passage of clots.  Denies any other possible urethral injuries, no blunt force trauma to the perineum, denies pressing in his perineum at all.  Denies history of kidney stones or prior hematuria.  States he does typically masturbate about 1-2 times per day, has never had this reaction in the past.  No reported self-instrumentation of the urethra at home.  Patient is able to void, does report urinary frequency.  On exam, penis is somewhat retracted/sunken into his significant subcutaneous tissue, though no blood at the meatus, penis is soft, nontender, no obvious rashes.  Urinalysis returned showing the expected blood, though also showed significant WBCs, WBC clumps, and leukocyte esterase.  No nitrates, no bacteria.  Discussed with urology, they recommended that he should be started on antibiotics, though as long as he can void freely and is not passing clots, can be discharged home and follow-up closely in their clinic, will likely need a cystoscopy or cystogram in the future.  Discussed return precautions with patient including difficulty with voiding or if he begins to pass clots, either which would make him return to  the ED for reevaluation and possible catheter placement.  Patient agreeable to plan.  Will discharge home with prescription for ciprofloxacin and the phone number for [facility].    Prior to making a final disposition on this patient the results of patient's tests and other diagnostic studies were discussed with the patient. All questions were answered. Patient expressed understanding of the plan and was amenable to it.    Medications - No data to display    Final Impression     1. Hematuria, unspecified type    2. Pyuria      Chief Complaint     Chief Complaint   Patient presents with     Hematuria     No notes on file    HPI     Jayesh Courtney is a 36 year old male who presents for evaluation of hematuria.     Patient reports he developed hematuria after masturbating around midnight (3/1). States he pressed down hard on his pelvis, at the superior base of his penis near climax. He noted droplets of blood in his urine while urinating after he completed masturbating. No current bleeding. Also complains of some urinary urgency. He has no pain related complaints at this time. Patient states he masturbates ~twice per day. No history of kidney stones or hematuria prior to this evening. No other complaints or concerns expressed at this time.    I, Hola Shultz am serving as a scribe to document services personally performed by Dr. Remy Clayton MD, based on my observation and the provider's statements to me. I, Dr. Remy Clayton MD attest that Hola Shultz is acting in a scribe capacity, has observed my performance of the services and has documented them in accordance with my direction.    Past Medical History     No past medical history on file.  No past surgical history on file.  Family History   Problem Relation Age of Onset     Diabetes Mother      Coronary Artery Disease Father         Stents placed      Social History     Tobacco Use     Smoking status: Never Smoker     Smokeless tobacco: Never Used  "  Substance Use Topics     Alcohol use: No     Drug use: No     No Known Allergies    Relevant past medical, surgical, family and social history as documented above, has been reviewed and discussed with patient. No changes or additions, unless otherwise noted in the HPI.    Current Medications     ciprofloxacin (CIPRO) 500 MG tablet  blood glucose meter (GLUCOMETER)  blood glucose monitoring (SOFTCLIX) lancets  blood glucose test (GLUCOSE BLOOD) strips  lisinopril (ZESTRIL) 20 MG tablet  nystatin (MYCOSTATIN) 638359 UNIT/GM external powder  valACYclovir (VALTREX) 1000 mg tablet        Review of Systems     Constitutional: Denies fever, chills  Cardiovascular: Denies chest pain, palpitations or leg swelling  GI: Denies abdominal pain, nausea, vomiting, or dark, bloody stools  : Endorses hematuria and urgency. Denies dysuria, flank pain, or penile pain  Musculoskeletal: Denies any new back pain  Skin: Denies rashes  Neurologic: Denies current headache, new weakness, focal weakness, or sensory changes      Remainder of systems reviewed, unless noted in HPI all others negative.    Physical Exam     BP (!) 141/73   Pulse 74   Temp 98.3  F (36.8  C) (Oral)   Ht 1.727 m (5' 8\")   Wt (!) 156 kg (344 lb)   SpO2 97%   BMI 52.31 kg/m    Constitutional: Awake, alert, in no acute distress  Head: Normocephalic, atraumatic.  ENT: Mucous membranes moist.   Eyes: PERRL, Conjunctiva normal  Respiratory: Respirations even, unlabored, in no acute respiratory distress.  Cardiovascular: Regular rate and rhythm.  GI: Abdomen soft, non-tender to palpation in all 4 quadrants. No guarding or rebound.   : Uncircumcised penis somewhat retracted into the subcutaneous tissue, no blood at the meatus, no tenderness with palpation of the glans or shaft of penis  Musculoskeletal: Moves all 4 extremities equally, strength symmetrical on bilateral uppers and lowers.  Integument: Warm, dry. No bruising or petechiae.  Neurologic: Alert & " oriented x 3. Normal speech. Grossly normal motor and sensory function.    Labs & Imaging     Results for orders placed or performed during the hospital encounter of 03/01/22   UA with Microscopic reflex to Culture    Specimen: Urine, Midstream   Result Value Ref Range    Color Urine Light Orange (A) Colorless, Straw, Light Yellow, Yellow    Appearance Urine Turbid (A) Clear    Glucose Urine Negative Negative mg/dL    Bilirubin Urine Negative Negative    Ketones Urine Negative Negative mg/dL    Specific Gravity Urine 1.010 1.001 - 1.030    Blood Urine >1.0 mg/dL (A) Negative    pH Urine 5.5 5.0 - 7.0    Protein Albumin Urine 70  (A) Negative mg/dL    Urobilinogen Urine <2.0 <2.0 mg/dL    Nitrite Urine Negative Negative    Leukocyte Esterase Urine 500 Toy/uL (A) Negative    WBC Clumps Urine Present (A) None Seen /HPF    Mucus Urine Present (A) None Seen /LPF    RBC Urine >182 (H) <=2 /HPF    WBC Urine >182 (H) <=5 /HPF    Squamous Epithelials Urine 1 <=1 /HPF    Transitional Epithelials Urine 1 <=1 /HPF            Remy Clayton MD  03/01/22 [id number]

## 2022-03-02 LAB — BACTERIA UR CULT: ABNORMAL

## 2022-03-03 ENCOUNTER — MYC MEDICAL ADVICE (OUTPATIENT)
Dept: INTERNAL MEDICINE | Facility: CLINIC | Age: 37
End: 2022-03-03
Payer: COMMERCIAL

## 2022-03-04 ENCOUNTER — TRANSFERRED RECORDS (OUTPATIENT)
Dept: HEALTH INFORMATION MANAGEMENT | Facility: CLINIC | Age: 37
End: 2022-03-04
Payer: COMMERCIAL

## 2022-03-10 ENCOUNTER — APPOINTMENT (OUTPATIENT)
Dept: URBAN - METROPOLITAN AREA CLINIC 260 | Age: 37
Setting detail: DERMATOLOGY
End: 2022-03-12

## 2022-03-10 VITALS — HEIGHT: 69 IN | WEIGHT: 315 LBS

## 2022-03-10 DIAGNOSIS — Q819 OTHER SPECIFIED ANOMALIES OF SKIN: ICD-10-CM

## 2022-03-10 DIAGNOSIS — Q828 OTHER SPECIFIED ANOMALIES OF SKIN: ICD-10-CM

## 2022-03-10 DIAGNOSIS — Q826 OTHER SPECIFIED ANOMALIES OF SKIN: ICD-10-CM

## 2022-03-10 DIAGNOSIS — L70.0 ACNE VULGARIS: ICD-10-CM

## 2022-03-10 DIAGNOSIS — Z71.89 OTHER SPECIFIED COUNSELING: ICD-10-CM

## 2022-03-10 PROBLEM — L85.8 OTHER SPECIFIED EPIDERMAL THICKENING: Status: ACTIVE | Noted: 2022-03-10

## 2022-03-10 PROCEDURE — OTHER COUNSELING: OTHER

## 2022-03-10 PROCEDURE — OTHER MIPS QUALITY: OTHER

## 2022-03-10 PROCEDURE — 99204 OFFICE O/P NEW MOD 45 MIN: CPT

## 2022-03-10 PROCEDURE — OTHER PRESCRIPTION: OTHER

## 2022-03-10 RX ORDER — TRETIONIN 0.5 MG/G
CREAM TOPICAL QHS
Qty: 20 | Refills: 3 | Status: ERX | COMMUNITY
Start: 2022-03-10

## 2022-03-10 ASSESSMENT — LOCATION ZONE DERM
LOCATION ZONE: ARM
LOCATION ZONE: LEG
LOCATION ZONE: FACE

## 2022-03-10 ASSESSMENT — LOCATION DETAILED DESCRIPTION DERM
LOCATION DETAILED: RIGHT MEDIAL FOREHEAD
LOCATION DETAILED: LEFT VENTRAL PROXIMAL FOREARM
LOCATION DETAILED: RIGHT DISTAL PRETIBIAL REGION

## 2022-03-10 ASSESSMENT — LOCATION SIMPLE DESCRIPTION DERM
LOCATION SIMPLE: LEFT FOREARM
LOCATION SIMPLE: RIGHT FOREHEAD
LOCATION SIMPLE: RIGHT PRETIBIAL REGION

## 2022-03-10 NOTE — PROCEDURE: COUNSELING
Tazorac Counseling:  Patient advised that medication is irritating and drying.  Patient may need to apply sparingly and wash off after an hour before eventually leaving it on overnight.  The patient verbalized understanding of the proper use and possible adverse effects of tazorac.  All of the patient's questions and concerns were addressed.
Birth Control Pills Counseling: Birth Control Pill Counseling: I discussed with the patient the potential side effects of OCPs including but not limited to increased risk of stroke, heart attack, thrombophlebitis, deep venous thrombosis, hepatic adenomas, breast changes, GI upset, headaches, and depression.  The patient verbalized understanding of the proper use and possible adverse effects of OCPs. All of the patient's questions and concerns were addressed.
Doxycycline Pregnancy And Lactation Text: This medication is Pregnancy Category D and not consider safe during pregnancy. It is also excreted in breast milk but is considered safe for shorter treatment courses.
Dapsone Pregnancy And Lactation Text: This medication is Pregnancy Category C and is not considered safe during pregnancy or breast feeding.
Spironolactone Counseling: Patient advised regarding risks of diarrhea, abdominal pain, hyperkalemia, birth defects (for female patients), liver toxicity and renal toxicity. The patient may need blood work to monitor liver and kidney function and potassium levels while on therapy. The patient verbalized understanding of the proper use and possible adverse effects of spironolactone.  All of the patient's questions and concerns were addressed.
Doxycycline Counseling:  Patient counseled regarding possible photosensitivity and increased risk for sunburn.  Patient instructed to avoid sunlight, if possible.  When exposed to sunlight, patients should wear protective clothing, sunglasses, and sunscreen.  The patient was instructed to call the office immediately if the following severe adverse effects occur:  hearing changes, easy bruising/bleeding, severe headache, or vision changes.  The patient verbalized understanding of the proper use and possible adverse effects of doxycycline.  All of the patient's questions and concerns were addressed.
Minocycline Pregnancy And Lactation Text: This medication is Pregnancy Category D and not consider safe during pregnancy. It is also excreted in breast milk.
Sarecycline Counseling: Patient advised regarding possible photosensitivity and discoloration of the teeth, skin, lips, tongue and gums.  Patient instructed to avoid sunlight, if possible.  When exposed to sunlight, patients should wear protective clothing, sunglasses, and sunscreen.  The patient was instructed to call the office immediately if the following severe adverse effects occur:  hearing changes, easy bruising/bleeding, severe headache, or vision changes.  The patient verbalized understanding of the proper use and possible adverse effects of sarecycline.  All of the patient's questions and concerns were addressed.
Erythromycin Pregnancy And Lactation Text: This medication is Pregnancy Category B and is considered safe during pregnancy. It is also excreted in breast milk.
Include Pregnancy/Lactation Warning?: No
Detail Level: Zone
Dapsone Counseling: I discussed with the patient the risks of dapsone including but not limited to hemolytic anemia, agranulocytosis, rashes, methemoglobinemia, kidney failure, peripheral neuropathy, headaches, GI upset, and liver toxicity.  Patients who start dapsone require monitoring including baseline LFTs and weekly CBCs for the first month, then every month thereafter.  The patient verbalized understanding of the proper use and possible adverse effects of dapsone.  All of the patient's questions and concerns were addressed.
Tazorac Pregnancy And Lactation Text: This medication is not safe during pregnancy. It is unknown if this medication is excreted in breast milk.
Topical Retinoid counseling:  Patient advised to apply a pea-sized amount only at bedtime and wait 30 minutes after washing their face before applying.  If too drying, patient may add a non-comedogenic moisturizer. The patient verbalized understanding of the proper use and possible adverse effects of retinoids.  All of the patient's questions and concerns were addressed.
Topical Retinoid Pregnancy And Lactation Text: This medication is Pregnancy Category C. It is unknown if this medication is excreted in breast milk.
Erythromycin Counseling:  I discussed with the patient the risks of erythromycin including but not limited to GI upset, allergic reaction, drug rash, diarrhea, increase in liver enzymes, and yeast infections.
High Dose Vitamin A Pregnancy And Lactation Text: High dose vitamin A therapy is contraindicated during pregnancy and breast feeding.
Winlevi Pregnancy And Lactation Text: This medication is considered safe during pregnancy and breastfeeding.
Minocycline Counseling: Patient advised regarding possible photosensitivity and discoloration of the teeth, skin, lips, tongue and gums.  Patient instructed to avoid sunlight, if possible.  When exposed to sunlight, patients should wear protective clothing, sunglasses, and sunscreen.  The patient was instructed to call the office immediately if the following severe adverse effects occur:  hearing changes, easy bruising/bleeding, severe headache, or vision changes.  The patient verbalized understanding of the proper use and possible adverse effects of minocycline.  All of the patient's questions and concerns were addressed.
Birth Control Pills Pregnancy And Lactation Text: This medication should be avoided if pregnant and for the first 30 days post-partum.
Isotretinoin Pregnancy And Lactation Text: This medication is Pregnancy Category X and is considered extremely dangerous during pregnancy. It is unknown if it is excreted in breast milk.
Spironolactone Pregnancy And Lactation Text: This medication can cause feminization of the male fetus and should be avoided during pregnancy. The active metabolite is also found in breast milk.
Isotretinoin Counseling: Patient should get monthly blood tests, not donate blood, not drive at night if vision affected, not share medication, and not undergo elective surgery for 6 months after tx completed. Side effects reviewed, pt to contact office should one occur.
Topical Sulfur Applications Counseling: Topical Sulfur Counseling: Patient counseled that this medication may cause skin irritation or allergic reactions.  In the event of skin irritation, the patient was advised to reduce the amount of the drug applied or use it less frequently.   The patient verbalized understanding of the proper use and possible adverse effects of topical sulfur application.  All of the patient's questions and concerns were addressed.
Topical Clindamycin Pregnancy And Lactation Text: This medication is Pregnancy Category B and is considered safe during pregnancy. It is unknown if it is excreted in breast milk.
Azithromycin Counseling:  I discussed with the patient the risks of azithromycin including but not limited to GI upset, allergic reaction, drug rash, diarrhea, and yeast infections.
Benzoyl Peroxide Pregnancy And Lactation Text: This medication is Pregnancy Category C. It is unknown if benzoyl peroxide is excreted in breast milk.
Tetracycline Counseling: Patient counseled regarding possible photosensitivity and increased risk for sunburn.  Patient instructed to avoid sunlight, if possible.  When exposed to sunlight, patients should wear protective clothing, sunglasses, and sunscreen.  The patient was instructed to call the office immediately if the following severe adverse effects occur:  hearing changes, easy bruising/bleeding, severe headache, or vision changes.  The patient verbalized understanding of the proper use and possible adverse effects of tetracycline.  All of the patient's questions and concerns were addressed. Patient understands to avoid pregnancy while on therapy due to potential birth defects.
Azelaic Acid Pregnancy And Lactation Text: This medication is considered safe during pregnancy and breast feeding.
Benzoyl Peroxide Counseling: Patient counseled that medicine may cause skin irritation and bleach clothing.  In the event of skin irritation, the patient was advised to reduce the amount of the drug applied or use it less frequently.   The patient verbalized understanding of the proper use and possible adverse effects of benzoyl peroxide.  All of the patient's questions and concerns were addressed.
Bactrim Pregnancy And Lactation Text: This medication is Pregnancy Category D and is known to cause fetal risk.  It is also excreted in breast milk.
Topical Sulfur Applications Pregnancy And Lactation Text: This medication is Pregnancy Category C and has an unknown safety profile during pregnancy. It is unknown if this topical medication is excreted in breast milk.
Bactrim Counseling:  I discussed with the patient the risks of sulfa antibiotics including but not limited to GI upset, allergic reaction, drug rash, diarrhea, dizziness, photosensitivity, and yeast infections.  Rarely, more serious reactions can occur including but not limited to aplastic anemia, agranulocytosis, methemoglobinemia, blood dyscrasias, liver or kidney failure, lung infiltrates or desquamative/blistering drug rashes.
Winlevi Counseling:  I discussed with the patient the risks of topical clascoterone including but not limited to erythema, scaling, itching, and stinging. Patient voiced their understanding.
Azithromycin Pregnancy And Lactation Text: This medication is considered safe during pregnancy and is also secreted in breast milk.
Topical Clindamycin Counseling: Patient counseled that this medication may cause skin irritation or allergic reactions.  In the event of skin irritation, the patient was advised to reduce the amount of the drug applied or use it less frequently.   The patient verbalized understanding of the proper use and possible adverse effects of clindamycin.  All of the patient's questions and concerns were addressed.
Azelaic Acid Counseling: Patient counseled that medicine may cause skin irritation and to avoid applying near the eyes.  In the event of skin irritation, the patient was advised to reduce the amount of the drug applied or use it less frequently.   The patient verbalized understanding of the proper use and possible adverse effects of azelaic acid.  All of the patient's questions and concerns were addressed.
High Dose Vitamin A Counseling: Side effects reviewed, pt to contact office should one occur.

## 2022-05-29 ENCOUNTER — HEALTH MAINTENANCE LETTER (OUTPATIENT)
Age: 37
End: 2022-05-29

## 2022-10-02 ENCOUNTER — HEALTH MAINTENANCE LETTER (OUTPATIENT)
Age: 37
End: 2022-10-02

## 2023-01-20 ENCOUNTER — MYC MEDICAL ADVICE (OUTPATIENT)
Dept: INTERNAL MEDICINE | Facility: CLINIC | Age: 38
End: 2023-01-20

## 2023-01-20 ENCOUNTER — OFFICE VISIT (OUTPATIENT)
Dept: INTERNAL MEDICINE | Facility: CLINIC | Age: 38
End: 2023-01-20
Payer: COMMERCIAL

## 2023-01-20 VITALS
WEIGHT: 315 LBS | BODY MASS INDEX: 47.74 KG/M2 | HEIGHT: 68 IN | TEMPERATURE: 98.1 F | DIASTOLIC BLOOD PRESSURE: 80 MMHG | SYSTOLIC BLOOD PRESSURE: 126 MMHG | OXYGEN SATURATION: 95 % | HEART RATE: 73 BPM | RESPIRATION RATE: 20 BRPM

## 2023-01-20 DIAGNOSIS — E11.9 TYPE 2 DIABETES MELLITUS WITHOUT COMPLICATION, WITHOUT LONG-TERM CURRENT USE OF INSULIN (H): Primary | ICD-10-CM

## 2023-01-20 DIAGNOSIS — Z23 INFLUENZA VACCINATION ADMINISTERED AT CURRENT VISIT: ICD-10-CM

## 2023-01-20 DIAGNOSIS — I10 BENIGN ESSENTIAL HYPERTENSION: ICD-10-CM

## 2023-01-20 DIAGNOSIS — Z23 HIGH PRIORITY FOR COVID-19 VACCINATION: ICD-10-CM

## 2023-01-20 DIAGNOSIS — E66.01 MORBID OBESITY (H): ICD-10-CM

## 2023-01-20 DIAGNOSIS — Z11.4 SCREENING FOR HIV (HUMAN IMMUNODEFICIENCY VIRUS): ICD-10-CM

## 2023-01-20 DIAGNOSIS — Z11.3 SCREEN FOR STD (SEXUALLY TRANSMITTED DISEASE): ICD-10-CM

## 2023-01-20 DIAGNOSIS — Z11.59 NEED FOR HEPATITIS C SCREENING TEST: ICD-10-CM

## 2023-01-20 DIAGNOSIS — Z13.220 SCREENING FOR HYPERLIPIDEMIA: ICD-10-CM

## 2023-01-20 LAB
ALBUMIN SERPL BCG-MCNC: 4.7 G/DL (ref 3.5–5.2)
ALP SERPL-CCNC: 62 U/L (ref 40–129)
ALT SERPL W P-5'-P-CCNC: 32 U/L (ref 10–50)
ANION GAP SERPL CALCULATED.3IONS-SCNC: 15 MMOL/L (ref 7–15)
AST SERPL W P-5'-P-CCNC: 37 U/L (ref 10–50)
BILIRUB SERPL-MCNC: 1 MG/DL
BUN SERPL-MCNC: 17.2 MG/DL (ref 6–20)
CALCIUM SERPL-MCNC: 9.9 MG/DL (ref 8.6–10)
CHLORIDE SERPL-SCNC: 100 MMOL/L (ref 98–107)
CHOLEST SERPL-MCNC: 213 MG/DL
CREAT SERPL-MCNC: 0.94 MG/DL (ref 0.67–1.17)
DEPRECATED HCO3 PLAS-SCNC: 22 MMOL/L (ref 22–29)
ERYTHROCYTE [DISTWIDTH] IN BLOOD BY AUTOMATED COUNT: 12.9 % (ref 10–15)
GFR SERPL CREATININE-BSD FRML MDRD: >90 ML/MIN/1.73M2
GLUCOSE SERPL-MCNC: 95 MG/DL (ref 70–99)
HBA1C MFR BLD: 5.3 % (ref 0–5.6)
HCT VFR BLD AUTO: 48.3 % (ref 40–53)
HCV AB SERPL QL IA: NONREACTIVE
HDLC SERPL-MCNC: 51 MG/DL
HGB BLD-MCNC: 15.6 G/DL (ref 13.3–17.7)
HIV 1+2 AB+HIV1 P24 AG SERPL QL IA: NONREACTIVE
LDLC SERPL CALC-MCNC: 141 MG/DL
MCH RBC QN AUTO: 26.6 PG (ref 26.5–33)
MCHC RBC AUTO-ENTMCNC: 32.3 G/DL (ref 31.5–36.5)
MCV RBC AUTO: 82 FL (ref 78–100)
NONHDLC SERPL-MCNC: 162 MG/DL
PLATELET # BLD AUTO: 200 10E3/UL (ref 150–450)
POTASSIUM SERPL-SCNC: 4.2 MMOL/L (ref 3.4–5.3)
PROT SERPL-MCNC: 8.2 G/DL (ref 6.4–8.3)
RBC # BLD AUTO: 5.87 10E6/UL (ref 4.4–5.9)
SODIUM SERPL-SCNC: 137 MMOL/L (ref 136–145)
T4 FREE SERPL-MCNC: 1.66 NG/DL (ref 0.9–1.7)
TRIGL SERPL-MCNC: 104 MG/DL
TSH SERPL DL<=0.005 MIU/L-ACNC: 4.21 UIU/ML (ref 0.3–4.2)
WBC # BLD AUTO: 6.5 10E3/UL (ref 4–11)

## 2023-01-20 PROCEDURE — 83036 HEMOGLOBIN GLYCOSYLATED A1C: CPT | Performed by: INTERNAL MEDICINE

## 2023-01-20 PROCEDURE — 87491 CHLMYD TRACH DNA AMP PROBE: CPT | Performed by: INTERNAL MEDICINE

## 2023-01-20 PROCEDURE — 90471 IMMUNIZATION ADMIN: CPT | Performed by: INTERNAL MEDICINE

## 2023-01-20 PROCEDURE — 84439 ASSAY OF FREE THYROXINE: CPT | Performed by: INTERNAL MEDICINE

## 2023-01-20 PROCEDURE — 84443 ASSAY THYROID STIM HORMONE: CPT | Performed by: INTERNAL MEDICINE

## 2023-01-20 PROCEDURE — 87389 HIV-1 AG W/HIV-1&-2 AB AG IA: CPT | Performed by: INTERNAL MEDICINE

## 2023-01-20 PROCEDURE — 0124A COVID-19 VACCINE BIVALENT BOOSTER 12+ (PFIZER): CPT | Performed by: INTERNAL MEDICINE

## 2023-01-20 PROCEDURE — 80061 LIPID PANEL: CPT | Performed by: INTERNAL MEDICINE

## 2023-01-20 PROCEDURE — 36415 COLL VENOUS BLD VENIPUNCTURE: CPT | Performed by: INTERNAL MEDICINE

## 2023-01-20 PROCEDURE — 87591 N.GONORRHOEAE DNA AMP PROB: CPT | Performed by: INTERNAL MEDICINE

## 2023-01-20 PROCEDURE — 85027 COMPLETE CBC AUTOMATED: CPT | Performed by: INTERNAL MEDICINE

## 2023-01-20 PROCEDURE — 91312 COVID-19 VACCINE BIVALENT BOOSTER 12+ (PFIZER): CPT | Performed by: INTERNAL MEDICINE

## 2023-01-20 PROCEDURE — 99214 OFFICE O/P EST MOD 30 MIN: CPT | Mod: 25 | Performed by: INTERNAL MEDICINE

## 2023-01-20 PROCEDURE — 90686 IIV4 VACC NO PRSV 0.5 ML IM: CPT | Performed by: INTERNAL MEDICINE

## 2023-01-20 PROCEDURE — 86803 HEPATITIS C AB TEST: CPT | Performed by: INTERNAL MEDICINE

## 2023-01-20 PROCEDURE — 80053 COMPREHEN METABOLIC PANEL: CPT | Performed by: INTERNAL MEDICINE

## 2023-01-20 NOTE — PATIENT INSTRUCTIONS
Annual preventive exam, Mr. Courtney has been doing pretty well since our last preventive visit of February 15, 2022, which was a little less than 1 year ago.  I told Jayesh that I would not bill today's visit as a low intensity regular office visit rather than a preventive visit.    Jayesh's weight is about the same, although he told me he has made some dietary modifications   He cut out rice, sweets, overall size of meals, and focusing on eating more vegetables.  He admits that he still eats in restaurants on weekends, and that is probably a significant source of calories.    I agree with his doing more strength training, but I really think the focus needs to be on food control.     He is fasting this morning, so we will send him for comprehensive metabolic panel, lipid profile, A1c, thyroid cascade.  Jayesh also requested STD screening tests    Will also administer Pfizer bivalent booster and seasonal flu shot     He continues to work as a     Type 2 diabetes but A1c normalized as of November 9, 2021  Body mass index 52.9 as of 1-, down from a BMI of 61   Trial of metformin starting January 20, 2023 focused on weight loss  New diagnosis A1c 6.9 January 12, 2021  He had an eye exam done in February 2022, and told that there were no signs of retinopathy     11-9-2021  Hemoglobin A1C 0.0 - 5.6 % 5.1      He saw Diabetes Education.  He does not have any symptoms of neuropathy, at this point there does not appear to be any endorgan damage.      Today January 20, 2023, we decided to give a try to a medication to help just to lose weight which would be metformin starting at 500 mg twice a day.  Jayesh has historical levels of A1c that met criteria for type 2 diabetes, and nowadays we monitor his A1c, which previously was at the nondiabetic level.  Nonetheless, I think that metformin might be useful for Jayesh to improve his insulin sensitivity, and reduce his appetite and hopefully help him lose  "weight.  It is a much more cost effective option than one of the GLP-1 injectables such as Wegovy.  I told him the main side effect to watch out for is gastrointestinal, meaning nausea and diarrhea.  But I told him that most patients to take metformin tolerated just fine.  If he is able to tolerate 500 mg twice a day, then we might escalate to 1000 mg twice a day.     Wt Readings from Last 5 Encounters:   01/20/23 (!) 157.9 kg (348 lb)   03/01/22 (!) 156 kg (344 lb)   02/15/22 (!) 156.1 kg (344 lb 3.2 oz)   01/23/22 (!) 156.9 kg (346 lb)   11/09/21 (!) 162 kg (357 lb 1 oz)     Cold sores, manifesting as small upper lip ulcers  on Valtrex using 1 g capsule, take 2 capsules equals 2000 mg twice on the day of an attack.  This is a 1 day course.     Mild case of yeast balanitis, use nystatin powder once or twice a day on a as needed basis, and I also reminded him to try to keep that area dry if he can.     Mostly resolved Piriformis syndrome left lower extremity, from December 2, 2021, he reports this is better, just a trace of tingling.     No longer having Palpitations, probably because his sleep apnea is treated now  Event monitor study January 21, 2021 showing 3 occasions of Mobitz type I second-degree AV block, and also sinus arrhythmia with bradycardia down to 25 bpm, in the context of likely severe obstructive sleep apnea; carvedilol stopped for that reason; mild left ventricular wall thickening seen on echocardiogram February 3, 2021      Carvedilol is been stopped.  His echocardiogram showed good systolic function, although there was mild concentric increase in left ventricular wall thickness observed.     Obstructive sleep apnea, excellent response to CPAP  He reports good results \"Like a brand new man\"     Hypertension in the context of obesity, type 2 diabetes, and obstructive sleep apnea, on lisinopril 20 mg a day,  Carvedilol stoppped.       Trace Lower extremity edema, related to obesity     Liver enzymes " ALT and AST, normalized as of Novemer 9, 2021    Low HDL (good cholesterol)  20-  Direct Measure HDL >=40 mg/dL 43      Triglycerides <=149 mg/dL 103      LDL Cholesterol Calculated <=129 mg/dL 127      Vaccine  Will also administer Pfizer bivalent booster and seasonal flu shot 1-  Immunization History   Administered Date(s) Administered    COVID-19 Vaccine 12+ (Pfizer) 04/14/2021, 05/05/2021, 12/03/2021    Influenza (H1N1) 01/19/2010    Influenza Vaccine 50-64 or 18-64 w/egg allergy (Flublok) 01/22/2021    Influenza Vaccine >6 months (Alfuria,Fluzone) 02/15/2022    MMR [birth year]/1990    Tdap (Adacel,Boostrix) 01/22/2021

## 2023-01-20 NOTE — PROGRESS NOTES
Office Visit - Follow Up   Jayesh Courtney   37 year old male    Date of Visit: 1/20/2023    Chief Complaint   Patient presents with     Diabetes     fasting        -------------------------------------------------------------------------------------------------------------------------  Assessment and Plan    Annual preventive exam, Mr. Courtney has been doing pretty well since our last preventive visit of February 15, 2022, which was a little less than 1 year ago.  I told Jayesh that I would not bill today's visit as a regular office visit rather than a preventive visit.    Jayesh's weight is about the same, although he told me he has made some dietary modifications   He cut out rice, sweets, overall size of meals, and focusing on eating more vegetables.  He admits that he still eats in restaurants on weekends, and that is probably a significant source of calories.    I agree with his doing more strength training, but I really think the focus needs to be on food control.     He is fasting this morning, so we will send him for comprehensive metabolic panel, lipid profile, A1c, thyroid cascade.  Jayesh also requested STD screening tests    Will also administer Pfizer bivalent booster and seasonal flu shot     He continues to work as a     Type 2 diabetes but A1c normalized as of November 9, 2021  Body mass index 52.9 as of 1-, down from a BMI of 61   Trial of metformin starting January 20, 2023 focused on weight loss  New diagnosis A1c 6.9 January 12, 2021  He had an eye exam done in February 2022, and told that there were no signs of retinopathy     11-9-2021  Hemoglobin A1C 0.0 - 5.6 % 5.1      He saw Diabetes Education.  He does not have any symptoms of neuropathy, at this point there does not appear to be any endorgan damage.      Today January 20, 2023, we decided to give a try to a medication to help just to lose weight which would be metformin starting at 500 mg twice a day.  Jayesh has  historical levels of A1c that met criteria for type 2 diabetes, and nowadays we monitor his A1c, which previously was at the nondiabetic level.  Nonetheless, I think that metformin might be useful for Jayesh to improve his insulin sensitivity, and reduce his appetite and hopefully help him lose weight.  It is a much more cost effective option than one of the GLP-1 injectables such as Wegovy.  I told him the main side effect to watch out for is gastrointestinal, meaning nausea and diarrhea.  But I told him that most patients to take metformin tolerated just fine.  If he is able to tolerate 500 mg twice a day, then we might escalate to 1000 mg twice a day.     Wt Readings from Last 5 Encounters:   01/20/23 (!) 157.9 kg (348 lb)   03/01/22 (!) 156 kg (344 lb)   02/15/22 (!) 156.1 kg (344 lb 3.2 oz)   01/23/22 (!) 156.9 kg (346 lb)   11/09/21 (!) 162 kg (357 lb 1 oz)     Cold sores, manifesting as small upper lip ulcers  on Valtrex using 1 g capsule, take 2 capsules equals 2000 mg twice on the day of an attack.  This is a 1 day course.     Mild case of yeast balanitis, use nystatin powder once or twice a day on a as needed basis, and I also reminded him to try to keep that area dry if he can.     Mostly resolved Piriformis syndrome left lower extremity, from December 2, 2021, he reports this is better, just a trace of tingling.     No longer having Palpitations, probably because his sleep apnea is treated now  Event monitor study January 21, 2021 showing 3 occasions of Mobitz type I second-degree AV block, and also sinus arrhythmia with bradycardia down to 25 bpm, in the context of likely severe obstructive sleep apnea; carvedilol stopped for that reason; mild left ventricular wall thickening seen on echocardiogram February 3, 2021      Carvedilol is been stopped.  His echocardiogram showed good systolic function, although there was mild concentric increase in left ventricular wall thickness observed.     Obstructive  "sleep apnea, excellent response to CPAP  He reports good results \"Like a brand new man\"     Hypertension in the context of obesity, type 2 diabetes, and obstructive sleep apnea, on lisinopril 20 mg a day,  Carvedilol stoppped.       Trace Lower extremity edema, related to obesity     Liver enzymes ALT and AST, normalized as of Novemer 9, 2021    Low HDL (good cholesterol)  20-  Direct Measure HDL >=40 mg/dL 43      Triglycerides <=149 mg/dL 103      LDL Cholesterol Calculated <=129 mg/dL 127      Vaccine  Will also administer Pfizer bivalent booster and seasonal flu shot 1-  Immunization History   Administered Date(s) Administered     COVID-19 Vaccine 12+ (Pfizer) 04/14/2021, 05/05/2021, 12/03/2021     Influenza (H1N1) 01/19/2010     Influenza Vaccine 50-64 or 18-64 w/egg allergy (Flublok) 01/22/2021     Influenza Vaccine >6 months (Alfuria,Fluzone) 02/15/2022     MMR 05/23/1990     Tdap (Adacel,Boostrix) 01/22/2021       --------------------------------------------------------------------------------------------------------------------------  History of Present Illness  This 37 year old old     Annual preventive exam, Mr. Courtney has been doing pretty well since our last preventive visit of February 15, 2022, which was a little less than 1 year ago.  I told Jayesh that I would not bill today's visit as a regular office visit rather than a preventive visit.    Jayesh's weight is about the same, although he told me he has made some dietary modifications   He cut out rice, sweets, overall size of meals, and focusing on eating more vegetables.  He admits that he still eats in restaurants on weekends, and that is probably a significant source of calories.    I agree with his doing more strength training, but I really think the focus needs to be on food control.     He is fasting this morning, so we will send him for comprehensive metabolic panel, lipid profile, A1c, thyroid cascade.  Jayesh also requested STD " screening tests    Will also administer Pfizer bivalent booster and seasonal flu shot      Wt Readings from Last 3 Encounters:   01/20/23 (!) 157.9 kg (348 lb)   03/01/22 (!) 156 kg (344 lb)   02/15/22 (!) 156.1 kg (344 lb 3.2 oz)     BP Readings from Last 3 Encounters:   01/20/23 126/80   03/01/22 (!) 141/73   02/15/22 130/72       Review of Systems: A comprehensive review of systems was negative except as noted.  ---------------------------------------------------------------------------------------------------------------------------    Medications, Allergies, Social, and Problem List   Current Outpatient Medications   Medication Sig Dispense Refill     blood glucose meter (GLUCOMETER) [BLOOD GLUCOSE METER (GLUCOMETER)] Use 1 each As Directed as needed. Dispense glucometer brand per patient's insurance at pharmacy discretion. 1 each 0     blood glucose monitoring (SOFTCLIX) lancets        blood glucose test (GLUCOSE BLOOD) strips [BLOOD GLUCOSE TEST (GLUCOSE BLOOD) STRIPS] Use 1 each As Directed 2 (two) times a day at 7:30am and 4:30pm. Dispense brand per patient's insurance at pharmacy discretion. 100 strip 11     lisinopril (ZESTRIL) 20 MG tablet TAKE 1 TABLET BY MOUTH EVERY DAY 90 tablet 3     metFORMIN (GLUCOPHAGE) 500 MG tablet Take 1 tablet (500 mg) by mouth 2 times daily (with meals) 60 tablet 11     No Known Allergies  Social History     Tobacco Use     Smoking status: Never     Smokeless tobacco: Never   Substance Use Topics     Alcohol use: No     Drug use: No     Patient Active Problem List   Diagnosis     Morbid obesity (H)     Type 2 diabetes mellitus without complication, without long-term current use of insulin (H)     COOPER (obstructive sleep apnea)     Benign essential hypertension     Mobitz type 1 second degree atrioventricular block     Palpitations     Abnormal LFTs        Reviewed, reconciled and updated       Physical Exam   General Appearance:       /80 (BP Location: Right arm, Patient  "Position: Sitting, Cuff Size: Adult Large)   Pulse 73   Temp 98.1  F (36.7  C)   Resp 20   Ht 1.727 m (5' 8\")   Wt (!) 157.9 kg (348 lb)   SpO2 95%   BMI 52.91 kg/m      General: Alert, in no distress  + Obese  Skin: No significant lesion seen.  Eyes/nose/throat: Eyes without scleral icterus, eye movements normal, pupils equal and reactive, oropharynx clear, ears with normal TM's  MSK: Neck with good ROM  Lymphatic: Neck without adenopathy or masses  Endocrine: Thyroid with no nodules to palpation  Pulm: Lungs clear to auscultation bilaterally  Cardiac: Distant heart sounds, Heart with regular rate and rhythm, no murmur or gallop  GI: Abdomen obese, soft, nontender. No palpable enlargement of liver or spleen  MSK: Extremities no tenderness   + Trace ankle edema  Neuro: Moves all extremities, without focal weakness  Psych: Alert, normal mental status. Normal affect and speech       Additional Information   I spent 30 minutes on this encounter, including reviewing interval history since last visit, examining the patient, explaining and counseling the issues enumerated in the Assessment and Plan (patient given a copy), ordering indicated tests, ordering prescriptions,     NOAH LANDEROS MD, MD          "

## 2023-01-21 LAB
C TRACH DNA SPEC QL NAA+PROBE: NEGATIVE
N GONORRHOEA DNA SPEC QL NAA+PROBE: NEGATIVE

## 2023-02-12 DIAGNOSIS — E11.9 TYPE 2 DIABETES MELLITUS WITHOUT COMPLICATION, WITHOUT LONG-TERM CURRENT USE OF INSULIN (H): ICD-10-CM

## 2023-02-16 ENCOUNTER — TRANSFERRED RECORDS (OUTPATIENT)
Dept: HEALTH INFORMATION MANAGEMENT | Facility: CLINIC | Age: 38
End: 2023-02-16

## 2023-02-16 LAB — RETINOPATHY: NEGATIVE

## 2023-02-22 ENCOUNTER — OFFICE VISIT (OUTPATIENT)
Dept: INTERNAL MEDICINE | Facility: CLINIC | Age: 38
End: 2023-02-22
Payer: COMMERCIAL

## 2023-02-22 VITALS
BODY MASS INDEX: 47.74 KG/M2 | SYSTOLIC BLOOD PRESSURE: 138 MMHG | HEIGHT: 68 IN | HEART RATE: 95 BPM | RESPIRATION RATE: 16 BRPM | TEMPERATURE: 98.5 F | WEIGHT: 315 LBS | DIASTOLIC BLOOD PRESSURE: 82 MMHG | OXYGEN SATURATION: 98 %

## 2023-02-22 DIAGNOSIS — E11.9 TYPE 2 DIABETES MELLITUS WITHOUT COMPLICATION, WITHOUT LONG-TERM CURRENT USE OF INSULIN (H): ICD-10-CM

## 2023-02-22 DIAGNOSIS — K64.4 EXTERNAL HEMORRHOIDS: ICD-10-CM

## 2023-02-22 DIAGNOSIS — I10 BENIGN ESSENTIAL HYPERTENSION: Primary | ICD-10-CM

## 2023-02-22 PROCEDURE — 99214 OFFICE O/P EST MOD 30 MIN: CPT | Performed by: NURSE PRACTITIONER

## 2023-02-22 NOTE — PROGRESS NOTES
Assessment & Plan     External hemorrhoids  Mildly thrombosed external hemorrhoid located at the 8 o'clock position.  Does not appear infected.  We had a conversation about managing external hemorrhoids, see patient structures below for plan of care    Benign essential hypertension  Controlled on lisinopril 20 mg    Type 2 diabetes mellitus without complication, without long-term current use of insulin (H)  Continues on metformin 500 mg twice daily with meals.  Had an hemoglobin A1c checked last month, less than 6    Patient Instructions   Start a fiber supplement such as Citrucel or Metamucil.  1 scoop in 8 ounce glass of water every day.    Continue getting fiber in your diet.    Drink lots of water throughout the day.    Avoid sitting on the toilet for prolonged periods.    You can try to reduce your hemorrhoid on your own in the shower.  While running a hot water, spread your buttocks and try to push on the hemorrhoid to reduce it.  Hold it at that position for a few minutes and see if it stays.  If not, that is okay.    If your hemorrhoid becomes itchy, you can use over-the-counter hydrocortisone cream as needed.  Keep your anal area clean.    If you develop any complications such as excessive pain, bleeding, or potential infection, come back for reevaluation and consideration of referral to colon and rectal surgery specialty    }  Mathieu Calhoun, [facility]    [place] Mcconnell is a 37 year old, presenting for the following health issues:    Rectal Problem (X 1 week)      HPI     Noticed hemorrhoid in the shower a couple of days ago. Wanted evaluation and discussion on the hemorrhoid.       Review of Systems   Constitutional, HEENT, cardiovascular, pulmonary, gi and gu systems are negative, except as otherwise noted.      Objective    /82 (BP Location: Right arm, Patient Position: Sitting, Cuff Size: Adult Large)   Pulse 95   Temp 98.5  F (36.9  C) (Oral)    "Resp 16   Ht 1.727 m (5' 8\")   Wt (!) 159.2 kg (351 lb)   SpO2 98%   BMI 53.37 kg/m    Body mass index is 53.37 kg/m .  Physical Exam     Thrombosed hemorrhoid 8 o'clock position, uncomplicated      "

## 2023-02-22 NOTE — PATIENT INSTRUCTIONS
Start a fiber supplement such as Citrucel or Metamucil.  1 scoop in 8 ounce glass of water every day.    Continue getting fiber in your diet.    Drink lots of water throughout the day.    Avoid sitting on the toilet for prolonged periods.    You can try to reduce your hemorrhoid on your own in the shower.  While running a hot water, spread your buttocks and try to push on the hemorrhoid to reduce it.  Hold it at that position for a few minutes and see if it stays.  If not, that is okay.    If your hemorrhoid becomes itchy, you can use over-the-counter hydrocortisone cream as needed.  Keep your anal area clean.    If you develop any complications such as excessive pain, bleeding, or potential infection, come back for reevaluation and consideration of referral to colon and rectal surgery specialty

## 2023-04-20 ENCOUNTER — TRANSFERRED RECORDS (OUTPATIENT)
Dept: HEALTH INFORMATION MANAGEMENT | Facility: CLINIC | Age: 38
End: 2023-04-20
Payer: COMMERCIAL

## 2023-04-26 ENCOUNTER — MYC MEDICAL ADVICE (OUTPATIENT)
Dept: INTERNAL MEDICINE | Facility: CLINIC | Age: 38
End: 2023-04-26
Payer: COMMERCIAL

## 2023-04-26 DIAGNOSIS — H10.33 ACUTE CONJUNCTIVITIS OF BOTH EYES, UNSPECIFIED ACUTE CONJUNCTIVITIS TYPE: Primary | ICD-10-CM

## 2023-04-27 RX ORDER — NEOMYCIN/POLYMYXIN B/HYDROCORT 3.5-10K-1
1-2 SUSPENSION, DROPS(FINAL DOSAGE FORM)(ML) OPHTHALMIC (EYE) 4 TIMES DAILY
Qty: 1.2 ML | Refills: 0 | Status: SHIPPED | OUTPATIENT
Start: 2023-04-27 | End: 2023-04-28 | Stop reason: ALTCHOICE

## 2023-04-28 ENCOUNTER — TELEPHONE (OUTPATIENT)
Dept: INTERNAL MEDICINE | Facility: CLINIC | Age: 38
End: 2023-04-28
Payer: COMMERCIAL

## 2023-04-28 DIAGNOSIS — H10.33 ACUTE CONJUNCTIVITIS OF BOTH EYES, UNSPECIFIED ACUTE CONJUNCTIVITIS TYPE: Primary | ICD-10-CM

## 2023-04-28 RX ORDER — NEOMYCIN SULFATE, POLYMYXIN B SULFATE, AND DEXAMETHASONE 3.5; 10000; 1 MG/G; [USP'U]/G; MG/G
0.5 OINTMENT OPHTHALMIC 4 TIMES DAILY
Qty: 3.5 G | Refills: 0 | Status: SHIPPED | OUTPATIENT
Start: 2023-04-28 | End: 2023-05-01

## 2023-04-28 NOTE — TELEPHONE ENCOUNTER
neomycin-polymyxin-hydrocortisone (CORTISPORIN) [id number] ophthalmic suspension  Product backordered / unavailable  Please consider the cost - effective potential alternatives listed and evaluate if appropriate for your patients indication and treatment goals.  Suggested alternatives:  Neomyc - polym-dexameth eye drop, dexamethasone 0.1% eye drop, Gentak 0.3 % eye ointment.

## 2023-05-20 ENCOUNTER — HEALTH MAINTENANCE LETTER (OUTPATIENT)
Age: 38
End: 2023-05-20

## 2023-09-07 ENCOUNTER — OFFICE VISIT (OUTPATIENT)
Dept: FAMILY MEDICINE | Facility: CLINIC | Age: 38
End: 2023-09-07
Payer: COMMERCIAL

## 2023-09-07 VITALS
DIASTOLIC BLOOD PRESSURE: 90 MMHG | OXYGEN SATURATION: 98 % | RESPIRATION RATE: 18 BRPM | WEIGHT: 315 LBS | HEART RATE: 66 BPM | BODY MASS INDEX: 55.8 KG/M2 | TEMPERATURE: 98.4 F | SYSTOLIC BLOOD PRESSURE: 157 MMHG

## 2023-09-07 DIAGNOSIS — S39.011A STRAIN OF MUSCLE OF RIGHT GROIN REGION: Primary | ICD-10-CM

## 2023-09-07 PROCEDURE — 99213 OFFICE O/P EST LOW 20 MIN: CPT | Performed by: FAMILY MEDICINE

## 2023-09-07 NOTE — PROGRESS NOTES
OUTPATIENT VISIT NOTE                                                   Date of Visit: 9/7/2023     Chief Complaint   Patient presents with:  Leg Pain: Pan on right leg since Monday. Started on inner thigh but gradually spread to front of right leg.             History of Present Illness   Jayesh Courtney is a 37 year old male c/o right thigh pain for the last three days.  Hurts to turn, stand up.  He had done a lot of work on his car with standing, laying down repetitively.    Has had gout in the past.       MEDICATIONS   Current Outpatient Medications   Medication    blood glucose meter (GLUCOMETER)    blood glucose monitoring (SOFTCLIX) lancets    blood glucose test (GLUCOSE BLOOD) strips    lisinopril (ZESTRIL) 20 MG tablet    metFORMIN (GLUCOPHAGE) 500 MG tablet     No current facility-administered medications for this visit.         SOCIAL HISTORY   Social History     Tobacco Use    Smoking status: Never    Smokeless tobacco: Never   Substance Use Topics    Alcohol use: No           Physical Exam   Vitals:    09/07/23 1740   BP: (!) 157/90   Pulse: 66   Resp: 18   Temp: 98.4  F (36.9  C)   TempSrc: Oral   SpO2: 98%   Weight: (!) 166.5 kg (367 lb)        GEN:  NAD  Right lower extremity: No swelling, erythema or ecchymosis.  Mild tenderness in groin. No swelling. Mildly painful with external rotation of hip         Assessment and Plan     Strain of muscle of right groin region  Patient Instructions   Tylenol two extra strength tablets up to four times a day. (Maximum 4000 mg per day)    Ibuprofen (Advil/Motrin) 200 mg tablet --three tablets up to four times a day. (Maximum 2400 mg per day)    Ice several times a day.    Relative rest.                  Discussed signs / symptoms that warrant urgent / emergent medical attention.   Recheck if worsening or not improving.       Magaly Pemberton MD          Pertinent History     The following portions of the patient's history were reviewed and updated as appropriate:  allergies, current medications, past family history, past medical history, past social history, past surgical history and problem list.

## 2023-09-07 NOTE — PATIENT INSTRUCTIONS
Tylenol two extra strength tablets up to four times a day. (Maximum 4000 mg per day)    Ibuprofen (Advil/Motrin) 200 mg tablet --three tablets up to four times a day. (Maximum 2400 mg per day)    Ice several times a day.    Relative rest.

## 2023-10-21 ENCOUNTER — HEALTH MAINTENANCE LETTER (OUTPATIENT)
Age: 38
End: 2023-10-21

## 2023-11-21 ENCOUNTER — MYC MEDICAL ADVICE (OUTPATIENT)
Dept: INTERNAL MEDICINE | Facility: CLINIC | Age: 38
End: 2023-11-21
Payer: COMMERCIAL

## 2023-11-21 ENCOUNTER — MYC REFILL (OUTPATIENT)
Dept: INTERNAL MEDICINE | Facility: CLINIC | Age: 38
End: 2023-11-21
Payer: COMMERCIAL

## 2023-11-21 DIAGNOSIS — E11.9 TYPE 2 DIABETES MELLITUS WITHOUT COMPLICATION, WITHOUT LONG-TERM CURRENT USE OF INSULIN (H): ICD-10-CM

## 2023-11-21 DIAGNOSIS — I10 BENIGN ESSENTIAL HYPERTENSION: ICD-10-CM

## 2023-11-21 RX ORDER — LISINOPRIL 20 MG/1
20 TABLET ORAL DAILY
Qty: 90 TABLET | Refills: 3 | OUTPATIENT
Start: 2023-11-21

## 2023-11-22 ENCOUNTER — MYC MEDICAL ADVICE (OUTPATIENT)
Dept: INTERNAL MEDICINE | Facility: CLINIC | Age: 38
End: 2023-11-22
Payer: COMMERCIAL

## 2023-11-22 RX ORDER — LISINOPRIL 20 MG/1
20 TABLET ORAL DAILY
Qty: 90 TABLET | Refills: 3 | Status: SHIPPED | OUTPATIENT
Start: 2023-11-22 | End: 2024-04-08

## 2024-03-09 ENCOUNTER — HEALTH MAINTENANCE LETTER (OUTPATIENT)
Age: 39
End: 2024-03-09

## 2024-03-20 ENCOUNTER — HOSPITAL ENCOUNTER (EMERGENCY)
Facility: CLINIC | Age: 39
Discharge: HOME | End: 2024-03-20
Attending: EMERGENCY MEDICINE | Admitting: EMERGENCY MEDICINE
Payer: COMMERCIAL

## 2024-03-20 VITALS
TEMPERATURE: 98.7 F | HEIGHT: 69 IN | OXYGEN SATURATION: 97 % | SYSTOLIC BLOOD PRESSURE: 191 MMHG | HEART RATE: 99 BPM | BODY MASS INDEX: 46.65 KG/M2 | WEIGHT: 315 LBS | DIASTOLIC BLOOD PRESSURE: 125 MMHG | RESPIRATION RATE: 19 BRPM

## 2024-03-20 DIAGNOSIS — N30.01 ACUTE CYSTITIS WITH HEMATURIA: ICD-10-CM

## 2024-03-20 LAB
ALBUMIN UR-MCNC: 100 MG/DL
APPEARANCE UR: ABNORMAL
BACTERIA #/AREA URNS HPF: ABNORMAL /HPF
BILIRUB UR QL STRIP: NEGATIVE
C TRACH DNA SPEC QL PROBE+SIG AMP: NEGATIVE
COLOR UR AUTO: YELLOW
GLUCOSE UR STRIP-MCNC: NEGATIVE MG/DL
HGB UR QL STRIP: ABNORMAL
KETONES UR STRIP-MCNC: NEGATIVE MG/DL
LEUKOCYTE ESTERASE UR QL STRIP: ABNORMAL
N GONORRHOEA DNA SPEC QL NAA+PROBE: NEGATIVE
NITRATE UR QL: NEGATIVE
PH UR STRIP: 5.5 [PH] (ref 5–7)
RBC URINE: >182 /HPF
SP GR UR STRIP: 1.02 (ref 1–1.03)
SQUAMOUS EPITHELIAL: 2 /HPF
UROBILINOGEN UR STRIP-MCNC: <2 MG/DL
WBC URINE: >182 /HPF

## 2024-03-20 PROCEDURE — 87186 SC STD MICRODIL/AGAR DIL: CPT | Performed by: EMERGENCY MEDICINE

## 2024-03-20 PROCEDURE — 87086 URINE CULTURE/COLONY COUNT: CPT | Performed by: EMERGENCY MEDICINE

## 2024-03-20 PROCEDURE — 87491 CHLMYD TRACH DNA AMP PROBE: CPT | Performed by: EMERGENCY MEDICINE

## 2024-03-20 PROCEDURE — 99283 EMERGENCY DEPT VISIT LOW MDM: CPT

## 2024-03-20 PROCEDURE — 81001 URINALYSIS AUTO W/SCOPE: CPT | Performed by: EMERGENCY MEDICINE

## 2024-03-20 RX ORDER — CEFDINIR 300 MG/1
300 CAPSULE ORAL 2 TIMES DAILY
Qty: 14 CAPSULE | Refills: 0 | Status: SHIPPED | OUTPATIENT
Start: 2024-03-20 | End: 2024-03-27

## 2024-03-20 ASSESSMENT — COLUMBIA-SUICIDE SEVERITY RATING SCALE - C-SSRS
6. HAVE YOU EVER DONE ANYTHING, STARTED TO DO ANYTHING, OR PREPARED TO DO ANYTHING TO END YOUR LIFE?: NO
1. IN THE PAST MONTH, HAVE YOU WISHED YOU WERE DEAD OR WISHED YOU COULD GO TO SLEEP AND NOT WAKE UP?: NO
2. HAVE YOU ACTUALLY HAD ANY THOUGHTS OF KILLING YOURSELF IN THE PAST MONTH?: NO

## 2024-03-20 ASSESSMENT — ACTIVITIES OF DAILY LIVING (ADL): ADLS_ACUITY_SCORE: 33

## 2024-03-20 NOTE — ED TRIAGE NOTES
To ED per POV    Pt states that at 0600 yesterday he had urgency with urination, and noticed blood on the toilet after voiding this AM.    Pt states he is sexually active and uses protection.       Triage Assessment (Adult)       Row Name 03/20/24 0052          Triage Assessment    Airway WDL WDL        Respiratory WDL    Respiratory WDL WDL        Skin Circulation/Temperature WDL    Skin Circulation/Temperature WDL WDL        Cardiac WDL    Cardiac WDL all     Pulse Rate & Regularity tachycardic        Peripheral/Neurovascular WDL    Peripheral Neurovascular WDL WDL        Cognitive/Neuro/Behavioral WDL    Cognitive/Neuro/Behavioral WDL WDL

## 2024-03-20 NOTE — ED PROVIDER NOTES
EMERGENCY DEPARTMENT ENCOUNTER      NAME: Jayesh Courtney  AGE: 38 year old male  YOB: 1985  MRN: [id number]  EVALUATION DATE & TIME: 3/20/2024 12:59 AM    PCP: Liam Boyd    ED PROVIDER: Mando Ortiz M.D.      Chief Complaint   Patient presents with    Urinary Frequency         FINAL IMPRESSION:  1. Acute cystitis with hematuria          ED COURSE & MEDICAL DECISION MAKING:    Pertinent Labs & Imaging studies reviewed. (See chart for details)  38 year old male presents to the Emergency Department for evaluation of dysuria.  Seems likely acute cystitis.  Did have a sexual encounter couple weeks ago.  Will test for GC chlamydia.  Urinalysis does show blood and white cells.  Will treat with Omnicef.  Will call with culture results.  Will follow-up with primary.  Patient's blood pressure is elevated here.  Will need to have this rechecked with his primary.  No abdominal pain.  No signs of AAA.  No signs of dissection.  No chest pain.  Will discharge home    1:11 AM I met with the patient to gather history and to perform my initial exam. I discussed the plan for care while in the Emergency Department.       At the conclusion of the encounter I discussed the results of all of the tests and the disposition. The questions were answered. The patient or family acknowledged understanding and was agreeable with the care plan.     Medical Decision Making  Obtained supplemental history:Supplemental history obtained?: No  Reviewed external records: External records reviewed?: No  Care impacted by chronic illness:N/A  Care significantly affected by social determinants of health:N/A  Did you consider but not order tests?: Work up considered but not performed and documented in chart, if applicable  Did you interpret images independently?: Independent interpretation of ECG and images noted in documentation, when applicable.  Consultation discussion with other provider:Did you involve another provider  "(consultant, , pharmacy, etc.)?: No  Discharge. I prescribed additional prescription strength medication(s) as charted. See documentation for any additional details.         MEDICATIONS GIVEN IN THE EMERGENCY:  Medications - No data to display    NEW PRESCRIPTIONS STARTED AT TODAY'S ER VISIT  Discharge Medication List as of 3/20/2024  1:35 AM        START taking these medications    Details   cefdinir (OMNICEF) 300 MG capsule Take 1 capsule (300 mg) by mouth 2 times daily for 7 days, Disp-14 capsule, R-0, Local Print                =================================================================    HPI    Patient information was obtained from: patient     Use of : N/A         Jayesh Courtney is a 38 year old male with a pertinent history of T2DM, hypertension, and morbid obesity who presents to this ED for evaluation of urinary symptoms.    The patient reports urinary urgency, \"stinging\" when he finishes urinating, \"pinkish\" hematuria, and a sensation of his bladder \"tensing up\" when he finished voiding. All of these symptoms began this morning. He thought that he had a bladder infection and proactively drank cranberry juice and a gallon of water. He has continued to have these symptoms and decided to come in for further management. He is not overtly concerned about STIs but states he had protected sex 2 weeks ago.    He denies back pain, nausea, vomiting, penile discharge, testicular pain, or any other complaints at this time.       PAST MEDICAL HISTORY:  No past medical history on file.    PAST SURGICAL HISTORY:  No past surgical history on file.        CURRENT MEDICATIONS:    No current facility-administered medications for this encounter.     Current Outpatient Medications   Medication    cefdinir (OMNICEF) 300 MG capsule    blood glucose meter (GLUCOMETER)    blood glucose monitoring (SOFTCLIX) lancets    blood glucose test (GLUCOSE BLOOD) strips    lisinopril (ZESTRIL) 20 MG tablet    metFORMIN " "(GLUCOPHAGE) 500 MG tablet         ALLERGIES:  No Known Allergies    FAMILY HISTORY:  Family History   Problem Relation Age of Onset    Diabetes Mother     Coronary Artery Disease Father         Stents placed       SOCIAL HISTORY:   Social History     Socioeconomic History    Marital status: Single   Tobacco Use    Smoking status: Never    Smokeless tobacco: Never   Substance and Sexual Activity    Alcohol use: No    Drug use: No       VITALS:  BP (!) 191/125   Pulse 99   Temp 98.7  F (37.1  C) (Oral)   Resp 19   Ht 1.753 m (5' 9\")   Wt (!) 180.1 kg (397 lb 1.6 oz)   SpO2 97%   BMI 58.64 kg/m      PHYSICAL EXAM    Physical Exam  Vitals and nursing note reviewed.   Constitutional:       General: He is not in acute distress.     Appearance: He is not diaphoretic.   HENT:      Head: Atraumatic.   Eyes:      General: No scleral icterus.     Pupils: Pupils are equal, round, and reactive to light.   Cardiovascular:      Rate and Rhythm: Normal rate and regular rhythm.      Heart sounds: Normal heart sounds.   Pulmonary:      Effort: No respiratory distress.      Breath sounds: Normal breath sounds.   Abdominal:      Palpations: Abdomen is soft.      Tenderness: There is no abdominal tenderness.   Musculoskeletal:         General: No tenderness.   Lymphadenopathy:      Cervical: No cervical adenopathy.   Skin:     General: Skin is warm.      Findings: No rash.           LAB:  All pertinent labs reviewed and interpreted.  Labs Ordered and Resulted from Time of ED Arrival to Time of ED Departure   ROUTINE UA WITH MICROSCOPIC REFLEX TO CULTURE - Abnormal       Result Value    Color Urine Yellow      Appearance Urine Turbid (*)     Glucose Urine Negative      Bilirubin Urine Negative      Ketones Urine Negative      Specific Gravity Urine 1.021      Blood Urine >1.0 mg/dL (*)     pH Urine 5.5      Protein Albumin Urine 100 (*)     Urobilinogen Urine <2.0      Nitrite Urine Negative      Leukocyte Esterase Urine 500 " Toy/uL (*)     Bacteria Urine Few (*)     RBC Urine >182 (*)     WBC Urine >182 (*)     Squamous Epithelials Urine 2 (*)    CHLAMYDIA TRACHOMATIS/NEISSERIA GONORRHOEAE BY PCR   URINE CULTURE       RADIOLOGY:  Reviewed all pertinent imaging. Please see official radiology report.  No orders to display         I, Russ Jaquez, am serving as a scribe to document services personally performed by Dr. Mando Ortiz, based on my observation and the provider's statements to me. I, Mando Ortiz MD attest that Russ Jaquez is acting in a scribe capacity, has observed my performance of the services and has documented them in accordance with my direction.    Mando Ortiz M.D.  Emergency Medicine  [facility] EMERGENCY ROOM  [address] [facility] [address] [phone]  Dept: [phone]       Mando Ortiz MD  03/20/24 0205

## 2024-03-22 LAB
BACTERIA UR CULT: ABNORMAL
BACTERIA UR CULT: ABNORMAL

## 2024-04-08 ENCOUNTER — MYC REFILL (OUTPATIENT)
Dept: INTERNAL MEDICINE | Facility: CLINIC | Age: 39
End: 2024-04-08
Payer: COMMERCIAL

## 2024-04-08 DIAGNOSIS — E11.9 TYPE 2 DIABETES MELLITUS WITHOUT COMPLICATION, WITHOUT LONG-TERM CURRENT USE OF INSULIN (H): ICD-10-CM

## 2024-04-08 DIAGNOSIS — I10 BENIGN ESSENTIAL HYPERTENSION: ICD-10-CM

## 2024-04-09 RX ORDER — LISINOPRIL 20 MG/1
20 TABLET ORAL DAILY
Qty: 90 TABLET | Refills: 3 | Status: SHIPPED | OUTPATIENT
Start: 2024-04-09 | End: 2024-07-21

## 2024-07-21 ENCOUNTER — MYC REFILL (OUTPATIENT)
Dept: INTERNAL MEDICINE | Facility: CLINIC | Age: 39
End: 2024-07-21
Payer: COMMERCIAL

## 2024-07-21 DIAGNOSIS — I10 BENIGN ESSENTIAL HYPERTENSION: ICD-10-CM

## 2024-07-21 DIAGNOSIS — E11.9 TYPE 2 DIABETES MELLITUS WITHOUT COMPLICATION, WITHOUT LONG-TERM CURRENT USE OF INSULIN (H): ICD-10-CM

## 2024-07-22 RX ORDER — LISINOPRIL 20 MG/1
20 TABLET ORAL DAILY
Qty: 90 TABLET | Refills: 3 | Status: SHIPPED | OUTPATIENT
Start: 2024-07-22 | End: 2024-07-31

## 2024-07-27 ENCOUNTER — HEALTH MAINTENANCE LETTER (OUTPATIENT)
Age: 39
End: 2024-07-27

## 2024-07-31 DIAGNOSIS — I10 BENIGN ESSENTIAL HYPERTENSION: ICD-10-CM

## 2024-07-31 DIAGNOSIS — E11.9 TYPE 2 DIABETES MELLITUS WITHOUT COMPLICATION, WITHOUT LONG-TERM CURRENT USE OF INSULIN (H): ICD-10-CM

## 2024-07-31 RX ORDER — LISINOPRIL 20 MG/1
20 TABLET ORAL DAILY
Qty: 90 TABLET | Refills: 2 | Status: SHIPPED | OUTPATIENT
Start: 2024-07-31

## 2024-07-31 NOTE — TELEPHONE ENCOUNTER
Call to pharmacy and verified that prescription was not received. New prescription has been sent. Will reply to patient via [place].

## 2024-07-31 NOTE — TELEPHONE ENCOUNTER
Please re-send. Pt states he went to go pick it up by the pharmacy said they never got it. There was confirmation on this 07/22 already. Unsure of what happened.

## 2024-10-11 ENCOUNTER — E-VISIT (OUTPATIENT)
Dept: URGENT CARE | Facility: CLINIC | Age: 39
End: 2024-10-11
Payer: COMMERCIAL

## 2024-10-11 DIAGNOSIS — R30.0 DYSURIA: Primary | ICD-10-CM

## 2024-10-11 PROCEDURE — 99421 OL DIG E/M SVC 5-10 MIN: CPT | Performed by: FAMILY MEDICINE

## 2024-10-11 NOTE — PATIENT INSTRUCTIONS
Dear Jayesh Courtney,     After reviewing your responses, I would like you to come in for a urine test to make sure we treat you correctly. This urine test is to evaluate you for a possible urinary tract infection, and should be scheduled for today or tomorrow. Schedule a Lab Only appointment here.     Lab appointments are not available at most locations on the weekends. If no Lab Only appointment is available, you should be seen in any of our convenient Walk-in or Urgent Care Centers, which can be found on our website here.     You will receive instructions with your results in [place] once they are available.     If your symptoms worsen, you develop pain in your back or stomach, develop fevers, or are not improving in 5 days, please contact your primary care provider for an appointment or visit a Walk-in or Urgent Care Center to be seen.     Thanks again for choosing us as your health care partner,     Angel Casper MD

## 2024-10-17 ENCOUNTER — LAB (OUTPATIENT)
Dept: LAB | Facility: CLINIC | Age: 39
End: 2024-10-17
Payer: COMMERCIAL

## 2024-10-17 DIAGNOSIS — R30.0 DYSURIA: ICD-10-CM

## 2024-10-17 DIAGNOSIS — N39.0 ACUTE UTI: Primary | ICD-10-CM

## 2024-10-17 LAB
ALBUMIN UR-MCNC: 100 MG/DL
APPEARANCE UR: ABNORMAL
BACTERIA #/AREA URNS HPF: ABNORMAL /HPF
BILIRUB UR QL STRIP: NEGATIVE
COLOR UR AUTO: YELLOW
GLUCOSE UR STRIP-MCNC: NEGATIVE MG/DL
HGB UR QL STRIP: ABNORMAL
KETONES UR STRIP-MCNC: NEGATIVE MG/DL
LEUKOCYTE ESTERASE UR QL STRIP: ABNORMAL
NITRATE UR QL: POSITIVE
PH UR STRIP: 5.5 [PH] (ref 5–8)
RBC #/AREA URNS AUTO: ABNORMAL /HPF
SP GR UR STRIP: 1.02 (ref 1–1.03)
SQUAMOUS #/AREA URNS AUTO: ABNORMAL /LPF
UROBILINOGEN UR STRIP-ACNC: 0.2 E.U./DL
WBC #/AREA URNS AUTO: ABNORMAL /HPF

## 2024-10-17 PROCEDURE — 81001 URINALYSIS AUTO W/SCOPE: CPT

## 2024-10-17 PROCEDURE — 87088 URINE BACTERIA CULTURE: CPT

## 2024-10-17 PROCEDURE — 87186 SC STD MICRODIL/AGAR DIL: CPT

## 2024-10-17 PROCEDURE — 87086 URINE CULTURE/COLONY COUNT: CPT

## 2024-10-17 RX ORDER — CEFDINIR 300 MG/1
300 CAPSULE ORAL 2 TIMES DAILY
Qty: 14 CAPSULE | Refills: 0 | Status: SHIPPED | OUTPATIENT
Start: 2024-10-17 | End: 2024-10-24

## 2024-10-19 LAB
BACTERIA UR CULT: ABNORMAL
BACTERIA UR CULT: ABNORMAL

## 2024-12-14 ENCOUNTER — HEALTH MAINTENANCE LETTER (OUTPATIENT)
Age: 39
End: 2024-12-14

## 2025-03-16 ENCOUNTER — HEALTH MAINTENANCE LETTER (OUTPATIENT)
Age: 40
End: 2025-03-16

## 2025-05-06 DIAGNOSIS — E11.9 TYPE 2 DIABETES MELLITUS WITHOUT COMPLICATION, WITHOUT LONG-TERM CURRENT USE OF INSULIN (H): ICD-10-CM

## 2025-05-06 DIAGNOSIS — I10 BENIGN ESSENTIAL HYPERTENSION: ICD-10-CM

## 2025-05-06 RX ORDER — LISINOPRIL 20 MG/1
20 TABLET ORAL DAILY
Qty: 90 TABLET | Refills: 2 | Status: SHIPPED | OUTPATIENT
Start: 2025-05-06

## 2025-06-29 ENCOUNTER — HEALTH MAINTENANCE LETTER (OUTPATIENT)
Age: 40
End: 2025-06-29

## 2025-08-10 ENCOUNTER — HEALTH MAINTENANCE LETTER (OUTPATIENT)
Age: 40
End: 2025-08-10

## 2025-08-13 ENCOUNTER — MYC REFILL (OUTPATIENT)
Dept: INTERNAL MEDICINE | Facility: CLINIC | Age: 40
End: 2025-08-13
Payer: COMMERCIAL

## 2025-08-13 DIAGNOSIS — I10 BENIGN ESSENTIAL HYPERTENSION: ICD-10-CM

## 2025-08-13 DIAGNOSIS — E11.9 TYPE 2 DIABETES MELLITUS WITHOUT COMPLICATION, WITHOUT LONG-TERM CURRENT USE OF INSULIN (H): ICD-10-CM

## 2025-08-13 RX ORDER — LISINOPRIL 20 MG/1
20 TABLET ORAL DAILY
Qty: 90 TABLET | Refills: 2 | OUTPATIENT
Start: 2025-08-13